# Patient Record
Sex: MALE | Race: WHITE | NOT HISPANIC OR LATINO | Employment: OTHER | URBAN - METROPOLITAN AREA
[De-identification: names, ages, dates, MRNs, and addresses within clinical notes are randomized per-mention and may not be internally consistent; named-entity substitution may affect disease eponyms.]

---

## 2017-12-11 ENCOUNTER — LAB VISIT (OUTPATIENT)
Dept: LAB | Facility: HOSPITAL | Age: 67
End: 2017-12-11
Attending: INTERNAL MEDICINE
Payer: MEDICARE

## 2017-12-11 DIAGNOSIS — E78.2 MIXED HYPERLIPIDEMIA: ICD-10-CM

## 2017-12-11 LAB
CHOLEST SERPL-MCNC: 119 MG/DL
CHOLEST/HDLC SERPL: 3.1 {RATIO}
HDLC SERPL-MCNC: 39 MG/DL
HDLC SERPL: 32.8 %
LDLC SERPL CALC-MCNC: 51.2 MG/DL
NONHDLC SERPL-MCNC: 80 MG/DL
TRIGL SERPL-MCNC: 144 MG/DL

## 2017-12-11 PROCEDURE — 36415 COLL VENOUS BLD VENIPUNCTURE: CPT

## 2017-12-11 PROCEDURE — 80061 LIPID PANEL: CPT

## 2017-12-20 ENCOUNTER — LAB VISIT (OUTPATIENT)
Dept: LAB | Facility: HOSPITAL | Age: 67
End: 2017-12-20
Attending: INTERNAL MEDICINE
Payer: MEDICARE

## 2017-12-20 DIAGNOSIS — E03.9 ACQUIRED HYPOTHYROIDISM: ICD-10-CM

## 2017-12-20 LAB
T3 SERPL-MCNC: 95 NG/DL
T4 FREE SERPL-MCNC: 1.04 NG/DL
TSH SERPL DL<=0.005 MIU/L-ACNC: 1.26 UIU/ML

## 2017-12-20 PROCEDURE — 84443 ASSAY THYROID STIM HORMONE: CPT

## 2017-12-20 PROCEDURE — 84439 ASSAY OF FREE THYROXINE: CPT

## 2017-12-20 PROCEDURE — 84480 ASSAY TRIIODOTHYRONINE (T3): CPT

## 2017-12-20 PROCEDURE — 36415 COLL VENOUS BLD VENIPUNCTURE: CPT | Mod: PO

## 2020-09-25 ENCOUNTER — TELEPHONE (OUTPATIENT)
Dept: RADIOLOGY | Facility: HOSPITAL | Age: 70
End: 2020-09-25

## 2020-09-25 DIAGNOSIS — Z01.812 PRE-PROCEDURE LAB EXAM: ICD-10-CM

## 2020-09-25 DIAGNOSIS — M48.061 SPINAL STENOSIS OF LUMBAR REGION WITHOUT NEUROGENIC CLAUDICATION: Primary | ICD-10-CM

## 2020-09-25 RX ORDER — ATORVASTATIN CALCIUM 10 MG/1
10 TABLET, FILM COATED ORAL DAILY
COMMUNITY
End: 2022-08-17 | Stop reason: SDUPTHER

## 2020-09-25 RX ORDER — LANOLIN ALCOHOL/MO/W.PET/CERES
2000 CREAM (GRAM) TOPICAL DAILY
COMMUNITY

## 2020-10-04 ENCOUNTER — LAB VISIT (OUTPATIENT)
Dept: SPORTS MEDICINE | Facility: CLINIC | Age: 70
End: 2020-10-04
Payer: MEDICARE

## 2020-10-04 DIAGNOSIS — Z01.812 PRE-PROCEDURE LAB EXAM: ICD-10-CM

## 2020-10-04 LAB — SARS-COV-2 RNA RESP QL NAA+PROBE: NOT DETECTED

## 2020-10-04 PROCEDURE — U0003 INFECTIOUS AGENT DETECTION BY NUCLEIC ACID (DNA OR RNA); SEVERE ACUTE RESPIRATORY SYNDROME CORONAVIRUS 2 (SARS-COV-2) (CORONAVIRUS DISEASE [COVID-19]), AMPLIFIED PROBE TECHNIQUE, MAKING USE OF HIGH THROUGHPUT TECHNOLOGIES AS DESCRIBED BY CMS-2020-01-R: HCPCS

## 2020-10-07 ENCOUNTER — HOSPITAL ENCOUNTER (OUTPATIENT)
Dept: RADIOLOGY | Facility: HOSPITAL | Age: 70
Discharge: HOME | End: 2020-10-07
Attending: ORTHOPAEDIC SURGERY
Payer: MEDICARE

## 2020-10-07 ENCOUNTER — HOSPITAL ENCOUNTER (OUTPATIENT)
Facility: HOSPITAL | Age: 70
Discharge: HOME | End: 2020-10-07
Attending: ORTHOPAEDIC SURGERY | Admitting: ORTHOPAEDIC SURGERY
Payer: MEDICARE

## 2020-10-07 VITALS
HEIGHT: 69 IN | BODY MASS INDEX: 32.88 KG/M2 | TEMPERATURE: 98 F | WEIGHT: 222 LBS | OXYGEN SATURATION: 97 % | SYSTOLIC BLOOD PRESSURE: 122 MMHG | HEART RATE: 74 BPM | RESPIRATION RATE: 16 BRPM | DIASTOLIC BLOOD PRESSURE: 77 MMHG

## 2020-10-07 DIAGNOSIS — M48.061 SPINAL STENOSIS OF LUMBAR REGION WITHOUT NEUROGENIC CLAUDICATION: ICD-10-CM

## 2020-10-07 LAB — GLUCOSE SERPL-MCNC: 96 MG/DL (ref 70–110)

## 2020-10-07 PROCEDURE — 72132 CT LUMBAR SPINE W/DYE: CPT | Mod: TC

## 2020-10-07 PROCEDURE — 71000015 HC POSTOP RECOV 1ST HR

## 2020-10-07 PROCEDURE — 25500020 PHARM REV CODE 255: Performed by: ORTHOPAEDIC SURGERY

## 2020-10-07 PROCEDURE — 71000016 HC POSTOP RECOV ADDL HR

## 2020-10-07 RX ADMIN — IOHEXOL 5 ML: 300 INJECTION, SOLUTION INTRAVENOUS at 10:10

## 2021-02-18 ENCOUNTER — IMMUNIZATION (OUTPATIENT)
Dept: INTERNAL MEDICINE | Facility: CLINIC | Age: 71
End: 2021-02-18
Payer: MEDICARE

## 2021-02-18 DIAGNOSIS — Z23 NEED FOR VACCINATION: Primary | ICD-10-CM

## 2021-02-18 PROCEDURE — 91300 COVID-19, MRNA, LNP-S, PF, 30 MCG/0.3 ML DOSE VACCINE: CPT | Mod: PBBFAC | Performed by: INTERNAL MEDICINE

## 2021-03-11 ENCOUNTER — IMMUNIZATION (OUTPATIENT)
Dept: INTERNAL MEDICINE | Facility: CLINIC | Age: 71
End: 2021-03-11
Payer: MEDICARE

## 2021-03-11 DIAGNOSIS — Z23 NEED FOR VACCINATION: Primary | ICD-10-CM

## 2021-03-11 PROCEDURE — 91300 COVID-19, MRNA, LNP-S, PF, 30 MCG/0.3 ML DOSE VACCINE: CPT | Mod: S$GLB,,, | Performed by: INTERNAL MEDICINE

## 2021-03-11 PROCEDURE — 0002A COVID-19, MRNA, LNP-S, PF, 30 MCG/0.3 ML DOSE VACCINE: CPT | Mod: CV19,S$GLB,, | Performed by: INTERNAL MEDICINE

## 2021-03-11 PROCEDURE — 0002A COVID-19, MRNA, LNP-S, PF, 30 MCG/0.3 ML DOSE VACCINE: ICD-10-PCS | Mod: CV19,S$GLB,, | Performed by: INTERNAL MEDICINE

## 2021-03-11 PROCEDURE — 91300 COVID-19, MRNA, LNP-S, PF, 30 MCG/0.3 ML DOSE VACCINE: ICD-10-PCS | Mod: S$GLB,,, | Performed by: INTERNAL MEDICINE

## 2021-08-11 PROBLEM — G60.9 PERIPHERAL NEUROPATHY, IDIOPATHIC: Status: ACTIVE | Noted: 2021-08-11

## 2021-08-18 PROBLEM — E78.1 ABNORMALLY LOW HIGH DENSITY LIPOPROTEIN (HDL) CHOLESTEROL WITH HYPERTRIGLYCERIDEMIA: Status: ACTIVE | Noted: 2021-08-18

## 2021-08-18 PROBLEM — E78.6 ABNORMALLY LOW HIGH DENSITY LIPOPROTEIN (HDL) CHOLESTEROL WITH HYPERTRIGLYCERIDEMIA: Status: ACTIVE | Noted: 2021-08-18

## 2021-11-10 PROBLEM — R33.8 BENIGN PROSTATIC HYPERPLASIA WITH URINARY RETENTION: Status: ACTIVE | Noted: 2021-11-10

## 2021-11-10 PROBLEM — N40.1 BENIGN PROSTATIC HYPERPLASIA WITH URINARY RETENTION: Status: ACTIVE | Noted: 2021-11-10

## 2022-02-24 PROBLEM — Z86.79 HISTORY OF SUBDURAL HEMATOMA: Status: ACTIVE | Noted: 2022-02-24

## 2022-08-17 PROBLEM — Z00.00 ANNUAL PHYSICAL EXAM: Status: ACTIVE | Noted: 2022-08-17

## 2022-08-17 PROBLEM — Z86.39 HISTORY OF HASHIMOTO THYROIDITIS: Status: ACTIVE | Noted: 2022-08-17

## 2022-11-16 PROBLEM — M15.0 PRIMARY OSTEOARTHRITIS INVOLVING MULTIPLE JOINTS: Status: ACTIVE | Noted: 2022-11-16

## 2022-11-16 PROBLEM — M15.9 PRIMARY OSTEOARTHRITIS INVOLVING MULTIPLE JOINTS: Status: ACTIVE | Noted: 2022-11-16

## 2022-11-16 PROBLEM — E66.812 CLASS 2 OBESITY DUE TO EXCESS CALORIES WITHOUT SERIOUS COMORBIDITY WITH BODY MASS INDEX (BMI) OF 37.0 TO 37.9 IN ADULT: Status: ACTIVE | Noted: 2022-11-16

## 2022-11-16 PROBLEM — E66.09 CLASS 2 OBESITY DUE TO EXCESS CALORIES WITHOUT SERIOUS COMORBIDITY WITH BODY MASS INDEX (BMI) OF 37.0 TO 37.9 IN ADULT: Status: ACTIVE | Noted: 2022-11-16

## 2022-11-21 PROBLEM — Z00.00 ANNUAL PHYSICAL EXAM: Status: RESOLVED | Noted: 2022-08-17 | Resolved: 2022-11-21

## 2023-02-22 PROBLEM — R60.0 PEDAL EDEMA: Status: ACTIVE | Noted: 2023-02-22

## 2023-02-22 PROBLEM — Z79.899 ENCOUNTER FOR LONG-TERM (CURRENT) USE OF MEDICATIONS: Status: ACTIVE | Noted: 2023-02-22

## 2023-02-22 PROBLEM — D50.9 IRON DEFICIENCY ANEMIA: Status: ACTIVE | Noted: 2023-02-22

## 2023-02-22 PROBLEM — E66.09 CLASS 1 OBESITY DUE TO EXCESS CALORIES WITH SERIOUS COMORBIDITY AND BODY MASS INDEX (BMI) OF 34.0 TO 34.9 IN ADULT: Status: ACTIVE | Noted: 2023-02-22

## 2023-02-22 PROBLEM — G25.81 RESTLESS LEG SYNDROME: Status: ACTIVE | Noted: 2023-02-22

## 2023-02-22 PROBLEM — I10 MODERATE HYPERTENSION: Status: ACTIVE | Noted: 2023-02-22

## 2023-02-22 PROBLEM — E66.811 CLASS 1 OBESITY DUE TO EXCESS CALORIES WITH SERIOUS COMORBIDITY AND BODY MASS INDEX (BMI) OF 34.0 TO 34.9 IN ADULT: Status: ACTIVE | Noted: 2023-02-22

## 2023-05-29 PROBLEM — Z00.00 ANNUAL PHYSICAL EXAM: Status: RESOLVED | Noted: 2022-08-17 | Resolved: 2023-05-29

## 2024-02-28 PROBLEM — Z12.5 SCREENING PSA (PROSTATE SPECIFIC ANTIGEN): Status: ACTIVE | Noted: 2024-02-28

## 2024-02-28 PROBLEM — Z00.00 ANNUAL PHYSICAL EXAM: Status: ACTIVE | Noted: 2024-02-28

## 2024-02-28 PROBLEM — I83.93 ASYMPTOMATIC VARICOSE VEINS OF BOTH LOWER EXTREMITIES: Status: ACTIVE | Noted: 2024-02-28

## 2024-05-05 ENCOUNTER — HOSPITAL ENCOUNTER (EMERGENCY)
Facility: HOSPITAL | Age: 74
Discharge: HOME | End: 2024-05-05
Attending: STUDENT IN AN ORGANIZED HEALTH CARE EDUCATION/TRAINING PROGRAM
Payer: MEDICARE

## 2024-05-05 VITALS
WEIGHT: 235 LBS | HEART RATE: 71 BPM | RESPIRATION RATE: 20 BRPM | OXYGEN SATURATION: 97 % | SYSTOLIC BLOOD PRESSURE: 143 MMHG | HEIGHT: 69 IN | BODY MASS INDEX: 34.8 KG/M2 | TEMPERATURE: 98 F | DIASTOLIC BLOOD PRESSURE: 97 MMHG

## 2024-05-05 DIAGNOSIS — R00.2 PALPITATION: ICD-10-CM

## 2024-05-05 DIAGNOSIS — R06.02 SHORTNESS OF BREATH: ICD-10-CM

## 2024-05-05 DIAGNOSIS — R00.2 PALPITATIONS: ICD-10-CM

## 2024-05-05 DIAGNOSIS — R42 DIZZINESS: Primary | ICD-10-CM

## 2024-05-05 LAB
ALBUMIN SERPL BCP-MCNC: 3.6 G/DL (ref 3.5–5.2)
ALP SERPL-CCNC: 32 U/L (ref 55–135)
ALT SERPL W/O P-5'-P-CCNC: 31 U/L (ref 10–44)
ANION GAP SERPL CALC-SCNC: 10 MMOL/L (ref 8–16)
AST SERPL-CCNC: 37 U/L (ref 10–40)
BASOPHILS # BLD AUTO: 0.06 K/UL (ref 0–0.2)
BASOPHILS NFR BLD: 1.1 % (ref 0–1.9)
BILIRUB SERPL-MCNC: 0.4 MG/DL (ref 0.1–1)
BNP SERPL-MCNC: 12 PG/ML (ref 0–99)
BUN SERPL-MCNC: 11 MG/DL (ref 8–23)
CALCIUM SERPL-MCNC: 9 MG/DL (ref 8.7–10.5)
CHLORIDE SERPL-SCNC: 109 MMOL/L (ref 95–110)
CO2 SERPL-SCNC: 21 MMOL/L (ref 23–29)
CREAT SERPL-MCNC: 0.8 MG/DL (ref 0.5–1.4)
DIFFERENTIAL METHOD BLD: ABNORMAL
EOSINOPHIL # BLD AUTO: 0.1 K/UL (ref 0–0.5)
EOSINOPHIL NFR BLD: 2 % (ref 0–8)
ERYTHROCYTE [DISTWIDTH] IN BLOOD BY AUTOMATED COUNT: 13 % (ref 11.5–14.5)
EST. GFR  (NO RACE VARIABLE): >60 ML/MIN/1.73 M^2
GLUCOSE SERPL-MCNC: 116 MG/DL (ref 70–110)
HCT VFR BLD AUTO: 38.3 % (ref 40–54)
HGB BLD-MCNC: 12.7 G/DL (ref 14–18)
IMM GRANULOCYTES # BLD AUTO: 0.07 K/UL (ref 0–0.04)
IMM GRANULOCYTES NFR BLD AUTO: 1.3 % (ref 0–0.5)
LYMPHOCYTES # BLD AUTO: 1.4 K/UL (ref 1–4.8)
LYMPHOCYTES NFR BLD: 25.3 % (ref 18–48)
MCH RBC QN AUTO: 30.6 PG (ref 27–31)
MCHC RBC AUTO-ENTMCNC: 33.2 G/DL (ref 32–36)
MCV RBC AUTO: 92 FL (ref 82–98)
MONOCYTES # BLD AUTO: 0.7 K/UL (ref 0.3–1)
MONOCYTES NFR BLD: 12.2 % (ref 4–15)
NEUTROPHILS # BLD AUTO: 3.2 K/UL (ref 1.8–7.7)
NEUTROPHILS NFR BLD: 58.1 % (ref 38–73)
NRBC BLD-RTO: 0 /100 WBC
PLATELET # BLD AUTO: 202 K/UL (ref 150–450)
PMV BLD AUTO: 9.3 FL (ref 9.2–12.9)
POTASSIUM SERPL-SCNC: 4 MMOL/L (ref 3.5–5.1)
PROT SERPL-MCNC: 6.6 G/DL (ref 6–8.4)
RBC # BLD AUTO: 4.15 M/UL (ref 4.6–6.2)
SODIUM SERPL-SCNC: 140 MMOL/L (ref 136–145)
T4 FREE SERPL-MCNC: 0.92 NG/DL (ref 0.71–1.51)
TROPONIN I SERPL DL<=0.01 NG/ML-MCNC: <0.006 NG/ML (ref 0–0.03)
TSH SERPL DL<=0.005 MIU/L-ACNC: 7.37 UIU/ML (ref 0.4–4)
WBC # BLD AUTO: 5.49 K/UL (ref 3.9–12.7)

## 2024-05-05 PROCEDURE — 96360 HYDRATION IV INFUSION INIT: CPT

## 2024-05-05 PROCEDURE — 93010 ELECTROCARDIOGRAM REPORT: CPT | Mod: ,,, | Performed by: INTERNAL MEDICINE

## 2024-05-05 PROCEDURE — 25000003 PHARM REV CODE 250: Performed by: STUDENT IN AN ORGANIZED HEALTH CARE EDUCATION/TRAINING PROGRAM

## 2024-05-05 PROCEDURE — 99285 EMERGENCY DEPT VISIT HI MDM: CPT | Mod: 25

## 2024-05-05 PROCEDURE — 85025 COMPLETE CBC W/AUTO DIFF WBC: CPT

## 2024-05-05 PROCEDURE — 84439 ASSAY OF FREE THYROXINE: CPT

## 2024-05-05 PROCEDURE — 84484 ASSAY OF TROPONIN QUANT: CPT

## 2024-05-05 PROCEDURE — 93005 ELECTROCARDIOGRAM TRACING: CPT

## 2024-05-05 PROCEDURE — 84443 ASSAY THYROID STIM HORMONE: CPT

## 2024-05-05 PROCEDURE — 80053 COMPREHEN METABOLIC PANEL: CPT

## 2024-05-05 PROCEDURE — 83880 ASSAY OF NATRIURETIC PEPTIDE: CPT

## 2024-05-05 RX ORDER — MECLIZINE HYDROCHLORIDE 25 MG/1
50 TABLET ORAL
Status: COMPLETED | OUTPATIENT
Start: 2024-05-05 | End: 2024-05-05

## 2024-05-05 RX ORDER — MECLIZINE HYDROCHLORIDE 25 MG/1
25 TABLET ORAL 3 TIMES DAILY PRN
Qty: 20 TABLET | Refills: 0 | Status: SHIPPED | OUTPATIENT
Start: 2024-05-05

## 2024-05-05 RX ADMIN — MECLIZINE HYDROCHLORIDE 50 MG: 25 TABLET ORAL at 12:05

## 2024-05-05 RX ADMIN — SODIUM CHLORIDE 1000 ML: 9 INJECTION, SOLUTION INTRAVENOUS at 12:05

## 2024-05-05 NOTE — DISCHARGE INSTRUCTIONS

## 2024-05-05 NOTE — ED PROVIDER NOTES
Encounter Date: 5/5/2024       History     Chief Complaint   Patient presents with    Palpitations     Woke him from sleep this morning around 0500 with associated dizziness and feeling off balance. Denies CP, SOB, diaphoresis. States BP was elevated at 158/101 PTA. Hx a-fib     74-year-old male presents with an episode of dizziness that began this morning.  He says that he felt as if his atrial fibrillation was acting up which resulted in his lightheadedness and dizziness.  This has now improved in his no longer as severe.  No chest pain shortness for breath.  He says he does not take anticoagulation for AFib as he is not always in this rhythm.  No fevers.  No recent instrumentation.  No numbness, dysphagia, dysarthria, numbness unilateral weakness, ataxia, falls or trauma.  He does have a history of feeling this sensation dizziness before and feels similar.  He says he does not have the medication that he is to take for dizziness.      Review of patient's allergies indicates:   Allergen Reactions    Niacin      Other reaction(s): FLUSHING AND ITCHING, FLUSHING AND ITCHING with 1500mg dose    Zanaflex [tizanidine]      dizziness     Past Medical History:   Diagnosis Date    History of Hashimoto thyroiditis     History of nephrolithiasis     Iron deficiency anemia 02/22/2023    Moderate hypertension 02/22/2023    Paroxysmal atrial fibrillation     Peripheral neuropathy, idiopathic 08/11/2021    Sleep apnea      Past Surgical History:   Procedure Laterality Date    ablasion Left     venous- leg    BACK SURGERY  11/2020    COLONOSCOPY  02/12/2019    Done by Dr. DAVID Romero    DENTAL SURGERY      EPIDURAL STEROID INJECTION  03/2018, 09/2018    pat reported    head surgery      for subdural hematoma    MYELOGRAPHY N/A 10/07/2020    Procedure: MYELOGRAM;  Surgeon: Cinda Diagnostic Provider;  Location: [facility];  Service: Interventional Radiology;  Laterality: N/A;    PROSTATE SURGERY      RHIZOTOMY  03/2019    pat reported     sinus bone spur      TONSILLECTOMY      UMBILICAL HERNIA REPAIR       Family History   Problem Relation Name Age of Onset    Diabetes Mother      Cancer Sister       Social History     Tobacco Use    Smoking status: Never    Smokeless tobacco: Never   Substance Use Topics    Alcohol use: No    Drug use: No     Review of Systems    Physical Exam     Initial Vitals [05/05/24 1153]   BP Pulse Resp Temp SpO2   133/73 88 18 98.3 °F (36.8 °C) 97 %      MAP       --         Physical Exam    Nursing note and vitals reviewed.  Constitutional: He appears well-developed and well-nourished. He is not diaphoretic. No distress.   HENT:   Head: Normocephalic and atraumatic.   Eyes: EOM are normal. Pupils are equal, round, and reactive to light.   With extraocular muscle testing, patient does become dizzy   Neck: Neck supple. No JVD present.   Normal range of motion.  Cardiovascular:  Normal rate, regular rhythm, normal heart sounds and intact distal pulses.     Exam reveals no gallop and no friction rub.       No murmur heard.  Pulmonary/Chest: Breath sounds normal. No stridor. No respiratory distress. He has no wheezes.   Abdominal: Abdomen is soft. There is no abdominal tenderness.   Musculoskeletal:         General: No tenderness or edema. Normal range of motion.      Cervical back: Normal range of motion and neck supple.     Neurological: He is alert and oriented to person, place, and time. GCS score is 15. GCS eye subscore is 4. GCS verbal subscore is 5. GCS motor subscore is 6.   Skin: Skin is warm and dry. Capillary refill takes less than 2 seconds.   Psychiatric: He has a normal mood and affect. Thought content normal.         ED Course   Procedures  Labs Reviewed   CBC W/ AUTO DIFFERENTIAL - Abnormal; Notable for the following components:       Result Value    RBC 4.15 (*)     Hemoglobin 12.7 (*)     Hematocrit 38.3 (*)     Immature Granulocytes 1.3 (*)     Immature Grans (Abs) 0.07 (*)     All other components within  normal limits   COMPREHENSIVE METABOLIC PANEL - Abnormal; Notable for the following components:    CO2 21 (*)     Glucose 116 (*)     Alkaline Phosphatase 32 (*)     All other components within normal limits   TROPONIN I   B-TYPE NATRIURETIC PEPTIDE   TSH          Imaging Results              X-Ray Chest AP Portable (Final result)  Result time 05/05/24 13:08:30      Final result by Inderjit Neville MD (05/05/24 13:08:30)                   Impression:      No radiographic acute intrathoracic process seen on this single view.      Electronically signed by: Inderjit Neville MD  Date:    05/05/2024  Time:    13:08               Narrative:    EXAMINATION:  XR CHEST AP PORTABLE    CLINICAL HISTORY:  Palpitations    TECHNIQUE:  Single frontal view of the chest was performed.    COMPARISON:  Chest radiograph 10/15/2013, CT calcium scoring 03/21/2024    FINDINGS:  Monitoring leads overlie the chest.  Resolution is limited by body habitus with underpenetration.    Cardiomediastinal silhouette is midline with calcification and tortuosity of the aorta.  Heart is not significantly enlarged.  Pulmonary vasculature and hilar contours are within normal limits.    Bibasilar minimal platelike scarring versus atelectasis.  The lungs are otherwise well expanded without consolidation, pleural effusion or pneumothorax.  Thoracic spinal electrodes noted.  No acute osseous process seen.  PA and lateral views can be obtained.                                       Medications   meclizine tablet 50 mg (50 mg Oral Given 5/5/24 1220)   sodium chloride 0.9% bolus 1,000 mL 1,000 mL (1,000 mLs Intravenous New Bag 5/5/24 1232)     Medical Decision Making  Hemodynamically stable. Afebrile. Phonating and protecting the airway spontaneously. No clinical evidence for cardiovascular instability or impending airway compromise. Examination as above. Additional historians include family at bedside. Prior medical records reviewed.  Per primary care notes, the  patient has a history of labyrinthitis. Current co-morbidities considered that will impact clinical decision making include as above.    Plan:  Labs, fluids, supportive care.  If dizziness does not improve, will obtain dedicated cerebral imaging.  Clinically, his postural dizziness does appear to be more suggestive for a peripheral process.  He has no in atrial fibrillation at this time.                 ED Course as of 05/05/24 1333   Sun May 05, 2024   1327 Chest x-ray reviewed.  CBC negative.  CMP negative.  Cardiac markers are negative. [BG]   1332 EKG reviewed, patient was not atrial fibrillation.  No regional ST segment elevation. The patient was reassessed and on subsequent re-evaluation, they were subjectively feeling better. They were resting comfortably and in no acute distress. I discussed the laboratory and diagnostic findings with the patient. Education was provided and all questions were answered. As discussed, they were recommended to follow up with their primary care physician within the next few days and to return to the emergency department sooner for any new or worsening. They acknowledged and verbalized agreement to the treatment plan. The patient was discharged home in stable condition.     DISCLAIMER: This note was prepared with [employer] voice recognition transcription software. Garbled syntax, mangled pronouns, and other bizarre constructions may be attributed to that software system.     [BG]      ED Course User Index  [BG] Steve Miller MD                           Clinical Impression:  Final diagnoses:  [R00.2] Palpitation  [R00.2] Palpitations  [R06.02] Shortness of breath  [R42] Dizziness (Primary)          ED Disposition Condition    Discharge Stable          ED Prescriptions       Medication Sig Dispense Start Date End Date Auth. Provider    meclizine (ANTIVERT) 25 mg tablet Take 1 tablet (25 mg total) by mouth 3 (three) times daily as needed for Dizziness. 20 tablet 5/5/2024 --  Steve Miller MD          Follow-up Information       Follow up With Specialties Details Why Contact Info    Nehemiah Grier, DO Family Medicine Go to  As needed [address]  [address]  [place] [address]  [phone]               Steve Miller MD  05/05/24 4721

## 2024-05-05 NOTE — ED TRIAGE NOTES
Pt presents to ED c/o palpitations and dizziness that woke him up around 0500. Report Hx of A-fib.

## 2024-05-07 LAB
OHS QRS DURATION: 94 MS
OHS QTC CALCULATION: 454 MS

## 2024-06-17 ENCOUNTER — HOSPITAL ENCOUNTER (EMERGENCY)
Facility: HOSPITAL | Age: 74
Discharge: HOME | End: 2024-06-17
Attending: EMERGENCY MEDICINE
Payer: MEDICARE

## 2024-06-17 VITALS
TEMPERATURE: 99 F | HEART RATE: 74 BPM | HEIGHT: 70 IN | SYSTOLIC BLOOD PRESSURE: 135 MMHG | RESPIRATION RATE: 19 BRPM | BODY MASS INDEX: 32.93 KG/M2 | OXYGEN SATURATION: 97 % | WEIGHT: 230 LBS | DIASTOLIC BLOOD PRESSURE: 80 MMHG

## 2024-06-17 DIAGNOSIS — T14.8XXA BLOOD BLISTER: ICD-10-CM

## 2024-06-17 DIAGNOSIS — M79.644 FINGER PAIN, RIGHT: ICD-10-CM

## 2024-06-17 DIAGNOSIS — S61.213A LACERATION OF LEFT MIDDLE FINGER WITHOUT FOREIGN BODY WITHOUT DAMAGE TO NAIL, INITIAL ENCOUNTER: Primary | ICD-10-CM

## 2024-06-17 PROCEDURE — 25000003 PHARM REV CODE 250

## 2024-06-17 PROCEDURE — 99284 EMERGENCY DEPT VISIT MOD MDM: CPT | Mod: 25

## 2024-06-17 RX ORDER — LIDOCAINE HYDROCHLORIDE 10 MG/ML
10 INJECTION INFILTRATION; PERINEURAL
Status: DISCONTINUED | OUTPATIENT
Start: 2024-06-17 | End: 2024-06-17

## 2024-06-17 RX ORDER — MUPIROCIN 20 MG/G
OINTMENT TOPICAL
Status: COMPLETED | OUTPATIENT
Start: 2024-06-17 | End: 2024-06-17

## 2024-06-17 RX ADMIN — MUPIROCIN: 20 OINTMENT TOPICAL at 02:06

## 2024-06-17 NOTE — ED PROVIDER NOTES
Encounter Date: 6/17/2024    SCRIBE #1 NOTE: I, Sarai Sen, am scribing for, and in the presence of,  Gil Sotomayor PA-C. I have scribed the following portions of the note - Other sections scribed: HPI, ROS, PE.       History     Chief Complaint   Patient presents with    Laceration     Pt states he was slicing cheese PTA and cut third digit to left hand. Pt also states a brick fell onto right index finger 1 week ago and has noticed a blood blister to digit. Full range of motion intact.      Patient is a 74 y.o. male with a past medical history of iron deficiency anemia, hypertension, and paroxysmal atrial fibrillation who presents to the Emergency Department for evaluation of laceration to left 3rd digit s/p accidentally slicing finger with knife 1 hour PTA.  He reports trying to/cheese with a knife.  Patient also reports a brick fell onto his right index finger 1 week ago and has noticed a blood blister on the tip of his finger.  He has been icing the area and soaking his finger in Epsom salt to soothe wound. Patient has not taken any medications for the symptoms. Patient is not on blood thinners. Denies history of diabetes mellitus or chronic kidney disease. Last tetanus unknown.  He denies fever, chills, nausea, or vomiting.  No numbness or tingling.  No other complaints today.    The history is provided by the patient. No  was used.     Review of patient's allergies indicates:   Allergen Reactions    Niacin      Other reaction(s): FLUSHING AND ITCHING, FLUSHING AND ITCHING with 1500mg dose    Zanaflex [tizanidine]      dizziness     Past Medical History:   Diagnosis Date    History of Hashimoto thyroiditis     History of nephrolithiasis     Iron deficiency anemia 02/22/2023    Moderate hypertension 02/22/2023    Paroxysmal atrial fibrillation     Peripheral neuropathy, idiopathic 08/11/2021    Sleep apnea      Past Surgical History:   Procedure Laterality Date    ablasion Left      venous- leg    BACK SURGERY  11/2020    COLONOSCOPY  02/12/2019    Done by Dr. DAVID Romero    DENTAL SURGERY      EPIDURAL STEROID INJECTION  03/2018, 09/2018    pat reported    head surgery      for subdural hematoma    MYELOGRAPHY N/A 10/07/2020    Procedure: MYELOGRAM;  Surgeon: Cinda Diagnostic Provider;  Location: [facility] OR;  Service: Interventional Radiology;  Laterality: N/A;    PROSTATE SURGERY      RHIZOTOMY  03/2019    pat reported    sinus bone spur      TONSILLECTOMY      UMBILICAL HERNIA REPAIR       Family History   Problem Relation Name Age of Onset    Diabetes Mother      Cancer Sister       Social History     Tobacco Use    Smoking status: Never    Smokeless tobacco: Never   Substance Use Topics    Alcohol use: No    Drug use: No     Review of Systems   Constitutional:  Negative for chills and fatigue.   Respiratory:  Negative for shortness of breath.    Cardiovascular:  Negative for chest pain.   Gastrointestinal:  Negative for abdominal pain, diarrhea and nausea.   Musculoskeletal:  Positive for arthralgias. Negative for joint swelling, neck pain and neck stiffness.   Skin:  Positive for wound (laceration to 3rd L digit, blister R index finger).   Neurological:  Negative for dizziness, light-headedness, numbness and headaches.       Physical Exam     Initial Vitals [06/17/24 1415]   BP Pulse Resp Temp SpO2   135/80 74 19 98.9 °F (37.2 °C) 97 %      MAP       --         Physical Exam    Nursing note and vitals reviewed.  Constitutional: He appears well-developed and well-nourished.   HENT:   Head: Normocephalic and atraumatic.   Right Ear: External ear normal.   Left Ear: External ear normal.   Neck: Carotid bruit is not present.   Normal range of motion.  Cardiovascular:  Normal rate, regular rhythm, normal heart sounds and intact distal pulses.     Exam reveals no gallop and no friction rub.       No murmur heard.  Pulmonary/Chest: Breath sounds normal. No respiratory distress. He has no wheezes. He  has no rhonchi. He has no rales.   Abdominal: Abdomen is soft. Bowel sounds are normal. He exhibits no distension. There is no abdominal tenderness. There is no rebound and no guarding.   Musculoskeletal:         General: Normal range of motion.      Cervical back: Normal range of motion.     Neurological: He is alert and oriented to person, place, and time. GCS score is 15. GCS eye subscore is 4. GCS verbal subscore is 5. GCS motor subscore is 6.   Skin:   The left 3rd digit is with missing piece of skin, distally.  Very minimal active bleeding.  Nothing required repair. Normal capillary refill and range of motion. Neurovascularly intact. Right index finger with blood blister, distally. Minimal swelling noted. Normal range of motion. No erythema over flexor tendon sheath. Neurovascularly intact.    Psychiatric: He has a normal mood and affect.         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      Electronically signed by: Barbra Marie MD  Date:    06/17/2024  Time:    14:48               Narrative:    EXAMINATION:  XR HAND COMPLETE 3 VIEW LEFT    CLINICAL HISTORY:  finger laceration;.    TECHNIQUE:  PA, lateral, and oblique views of the left hand were performed.    COMPARISON:  None    FINDINGS:  The alignment of the osseous structures is normal.  The mineralization is normal.    Remote ulnar styloid fracture.    Severe scaphotrapezium joint space narrowing and sclerosis, joint space narrowing, and osteophyte at the 1st carpometacarpal joint consistent with degenerative change.  The metacarpophalangeal joints and proximal interphalangeal joints appear within normal limits.    There is significant joint space  narrowing, osteophyte formation and mild erosive changes mainly at the distal interphalangeal joint of the 3rd and 5th digit.  No soft tissue air, no foreign body.                                       X-Ray Hand 3 View Right (Final result)  Result time 06/17/24 14:50:19      Final result by Barbra Marie MD (06/17/24 14:50:19)                   Impression:      Degenerative change most severe at the 1st carpometacarpal joint and distal interphalangeal joints.      Electronically signed by: Barbra Marie MD  Date:    06/17/2024  Time:    14:50               Narrative:    EXAMINATION:  XR HAND COMPLETE 3 VIEW RIGHT    CLINICAL HISTORY:  right hand pain;    TECHNIQUE:  PA, lateral, and oblique views of the right hand were performed.    COMPARISON:  None    FINDINGS:  Normal mineralization, normal alignment.    The radiocarpal joint, intercarpal joint, 2nd through 5th carpometacarpal joints appear normal.    Joint space narrowing, sclerosis and osteophyte at the 1st carpometacarpal joint.    Joint space narrowing and osteophyte at the 3rd metacarpophalangeal joint.    The proximal interphalangeal joints are within normal limits.  There is diffuse distal interphalangeal joint space narrowing.    No fracture, no osseous lesions.    The soft tissues appear normal.                                       Medications   Tdap (BOOSTRIX) vaccine injection 0.5 mL (has no administration in time range)   mupirocin 2 % ointment ( Topical (Top) Given 6/17/24 1441)     Medical Decision Making  This is an emergent evaluation of a  74 y.o. male with a past medical history of iron deficiency anemia, hypertension, and paroxysmal atrial fibrillation who presents to the Emergency Department for evaluation of laceration to left 3rd digit s/p accidentally slicing finger with knife 1 hour PTA.  Also reports right index finger pain after brick falling onto it 1 week ago.    Patient looks well clinically. The left 3rd digit is  with missing piece of skin, distally.  Very minimal active bleeding.  Nothing required repair. Normal capillary refill and range of motion. Neurovascularly intact. Right index finger with blood blister, distally. Minimal swelling noted. Normal range of motion. No erythema over flexor tendon sheath. Neurovascularly intact.  Regular rate rhythm without murmurs.  No carotid bruits appreciated on exam. Lungs are clear to auscultation bilaterally.  Abdomen is soft, nontender, non distended, with normal bowel sounds.     Differential diagnosis includes but is not limited to fracture, dislocation, sprain, laceration, cellulitis, other infection.  Considered but doubt flexor tenosynovitis.    Workup initiated with x-rays of bilateral hands.  Ordered wound cleaning by nursing staff.  Ordered mupirocin ointment and tetanus shot.  Vital signs, chart, labs, and/or imaging were all reviewed.  See ED course below and interpretations above. My overall impression is wound not requiring repair. Will discharge home with mupirocin ointment to prevent infection.  Wound care instructions provided. Patient is very well appearing, and in no acute distress. Vital signs are reassuring here in the emergency department, patient is afebrile, breathing comfortable, satting 97 % on room air. Patient/Caregiver is stable for discharge at this time.  Patient/Caregiver was informed of results and plan of care. Patient/Caregiver verbalized understanding of care plan. All questions and concerns were addressed. Discussed strict return precautions with the patient/caregiver. Instructed follow up with primary care provider within 1 week.      Gil Sotomayor PA-C    DISCLAIMER: This note was prepared with [employer] voice recognition transcription software. Garbled syntax, mangled pronouns, and other bizarre constructions may be attributed to that software system.       Amount and/or Complexity of Data Reviewed  Radiology: ordered. Decision-making details  documented in ED Course.    Risk  Prescription drug management.            Scribe Attestation:   Scribe #1: I performed the above scribed service and the documentation accurately describes the services I performed. I attest to the accuracy of the note.        ED Course as of 06/17/24 1500   Mon Jun 17, 2024   1428 Patient presenting to the emergency department for evaluation of left 3rd digit pain after slicing finger with knife while cutting cheese.  He is unsure of last tetanus vaccine.  He also reports right index finger pain after dropping brick on it 1 week ago.  On exam, there is skin missing to the distal left 3rd digit.  Nothing requiring repair.  Normal capillary refill.  Normal range of motion.  Neurovascularly intact.  Will obtain x-rays, clean wounds, apply mupirocin ointment. [TM]   1456 X-Ray Hand 3 view Left  The alignment of the osseous structures is normal.  The mineralization is normal.     Remote ulnar styloid fracture.     Severe scaphotrapezium joint space narrowing and sclerosis, joint space narrowing, and osteophyte at the 1st carpometacarpal joint consistent with degenerative change.  The metacarpophalangeal joints and proximal interphalangeal joints appear within normal limits.     There is significant joint space narrowing, osteophyte formation and mild erosive changes mainly at the distal interphalangeal joint of the 3rd and 5th digit.  No soft tissue air, no foreign body.   [TM]   1456 X-Ray Hand 3 View Right  Normal mineralization, normal alignment.     The radiocarpal joint, intercarpal joint, 2nd through 5th carpometacarpal joints appear normal.     Joint space narrowing, sclerosis and osteophyte at the 1st carpometacarpal joint.     Joint space narrowing and osteophyte at the 3rd metacarpophalangeal joint.     The proximal interphalangeal joints are within normal limits.  There is diffuse distal interphalangeal joint space narrowing.     No fracture, no osseous lesions.     The soft  tissues appear normal.      [TM]      ED Course User Index  [TM] Gil Sotomayor PA-C                         I, Gil Sotomyaor PA-C, personally performed the services described in this documentation. All medical record entries made by the scribe were at my direction and in my presence. I have reviewed the chart and agree that the record reflects my personal performance and is accurate and complete.    Clinical Impression:  Final diagnoses:  [S61.213A] Laceration of left middle finger without foreign body without damage to nail, initial encounter (Primary)  [T14.8XXA] Blood blister  [M79.644] Finger pain, right          ED Disposition Condition    Discharge Stable          ED Prescriptions    None       Follow-up Information       Follow up With Specialties Details Why Contact Info    Nehemiah Grier, DO Family Medicine   [address] [place] [id number] [phone]      [facility] - Emergency Dept Emergency Medicine Go to  As needed, If symptoms worsen, or new symptoms develop [address] [facility] [place] [address] [phone]             Gil Sotomayor PA-C  06/17/24 8224

## 2024-06-17 NOTE — DISCHARGE INSTRUCTIONS
You were seen in the emergency department today for finger wounds. Your x-rays showed no broken bones but a lot of arthritis. Please use mupirocin ointment as we discussed to prevent infection. Follow up with your PCP. It is important to remember that some problems are difficult to diagnose and may not be found during your Emergency Department visit. Be sure to follow up with your primary care doctor and review all labs/imaging/tests that were performed during this visit with them. Some labs/tests may be outside of the normal range and require non-emergent follow-up and further investigation to help diagnose/exclude/prevent complications or other medical conditions. Return to the emergency department for any new or worsening symptoms. Thank you for allowing me to care for you today, it was my pleasure. I hope you get to feeling better soon!

## 2024-07-24 PROBLEM — I47.10 SUPRAVENTRICULAR TACHYCARDIA: Status: ACTIVE | Noted: 2024-07-24

## 2024-07-29 ENCOUNTER — HOSPITAL ENCOUNTER (OUTPATIENT)
Dept: RADIOLOGY | Facility: HOSPITAL | Age: 74
Discharge: HOME | End: 2024-07-29
Attending: FAMILY MEDICINE
Payer: MEDICARE

## 2024-07-29 DIAGNOSIS — Z01.810 PREOP CARDIOVASCULAR EXAM: ICD-10-CM

## 2024-07-29 PROCEDURE — 71046 X-RAY EXAM CHEST 2 VIEWS: CPT | Mod: TC,FY

## 2024-07-29 PROCEDURE — 71046 X-RAY EXAM CHEST 2 VIEWS: CPT | Mod: 26,,, | Performed by: RADIOLOGY

## 2024-09-04 ENCOUNTER — OFFICE VISIT (OUTPATIENT)
Dept: CARDIOLOGY | Facility: CLINIC | Age: 74
End: 2024-09-04
Payer: MEDICARE

## 2024-09-04 VITALS
DIASTOLIC BLOOD PRESSURE: 70 MMHG | HEIGHT: 69 IN | OXYGEN SATURATION: 94 % | RESPIRATION RATE: 18 BRPM | WEIGHT: 232.5 LBS | HEART RATE: 86 BPM | SYSTOLIC BLOOD PRESSURE: 130 MMHG | BODY MASS INDEX: 34.44 KG/M2

## 2024-09-04 DIAGNOSIS — I25.84 CORONARY ARTERY CALCIFICATION: Primary | ICD-10-CM

## 2024-09-04 DIAGNOSIS — I25.10 CORONARY ARTERY CALCIFICATION: Primary | ICD-10-CM

## 2024-09-04 DIAGNOSIS — I25.10 CORONARY ARTERY DISEASE, UNSPECIFIED VESSEL OR LESION TYPE, UNSPECIFIED WHETHER ANGINA PRESENT, UNSPECIFIED WHETHER NATIVE OR TRANSPLANTED HEART: ICD-10-CM

## 2024-09-04 PROCEDURE — 99999 PR PBB SHADOW E&M-EST. PATIENT-LVL IV: CPT | Mod: PBBFAC,,, | Performed by: INTERNAL MEDICINE

## 2024-09-04 PROCEDURE — 3078F DIAST BP <80 MM HG: CPT | Mod: CPTII,S$GLB,, | Performed by: INTERNAL MEDICINE

## 2024-09-04 PROCEDURE — 3075F SYST BP GE 130 - 139MM HG: CPT | Mod: CPTII,S$GLB,, | Performed by: INTERNAL MEDICINE

## 2024-09-04 PROCEDURE — 99204 OFFICE O/P NEW MOD 45 MIN: CPT | Mod: S$GLB,,, | Performed by: INTERNAL MEDICINE

## 2024-09-04 PROCEDURE — 1159F MED LIST DOCD IN RCRD: CPT | Mod: CPTII,S$GLB,, | Performed by: INTERNAL MEDICINE

## 2024-09-04 PROCEDURE — 3008F BODY MASS INDEX DOCD: CPT | Mod: CPTII,S$GLB,, | Performed by: INTERNAL MEDICINE

## 2024-09-04 NOTE — PROGRESS NOTES
CARDIOVASCULAR CONSULTATION    REASON FOR CONSULT:   Trell Richmond is a 74 y.o. male who presents for No chief complaint on file.       Ordered on: 5/5/2024   Associated Dx: Palpitations; Shortness of breath   Authorizing provider: Steve Miller MD         HISTORY OF PRESENT ILLNESS:     Patient is a pleasant 74-year-old man.  Has been referred for palpitations and shortness of breath as well as dyspnea on exertion.  Denies any resting chest pains.  Did have calcium scoring done which demonstrated elevated coronary calcium as listed.  Denies orthopnea PND    Results for orders placed or performed during the hospital encounter of 05/05/24   EKG 12-lead    Collection Time: 05/05/24 11:46 AM   Result Value Ref Range    QRS Duration 94 ms    [facility] QTC Calculation 454 ms    Narrative    Test Reason : R00.2,    Vent. Rate : 086 BPM     Atrial Rate : 086 BPM     P-R Int : 202 ms          QRS Dur : 094 ms      QT Int : 380 ms       P-R-T Axes : 043 -31 001 degrees     QTc Int : 454 ms    Normal sinus rhythm  Left axis deviation  Minimal voltage criteria for LVH, may be normal variant ( R in aVL )  Possible Anterior infarct ,age undetermined  Abnormal ECG  No previous ECGs available  Confirmed by Danie Milan MD ([id number]) on 5/7/2024 9:10:32 PM    Referred By: [email or web address]   SELF           Confirmed By:Danie Milan MD       SCORE SUMMARY     Your total calcium score is 153.6.     Incidental findings: Included portions of the chest demonstrate no pericardial effusion.  The thoracic aorta appears not aneurysmal.  There are calcified granulomatous lymph nodes identified within the mediastinum and right hilum.  There is calcified granuloma within the right middle lobe.  No consolidation, pleural effusion, or pneumothorax identified of the included portions of the lungs.  No acute displaced fracture identified.     Impression:     Your total calcium score is 153.6.  Moderate to high coronary heart disease risk.         Electronically signed by:Avelino Barrios MD  Date:                                            03/21/2024  Time:                                           12:22      PAST MEDICAL HISTORY:     Past Medical History:   Diagnosis Date    History of Hashimoto thyroiditis     History of nephrolithiasis     Iron deficiency anemia 02/22/2023    Moderate hypertension 02/22/2023    Paroxysmal atrial fibrillation     Peripheral neuropathy, idiopathic 08/11/2021    Sleep apnea        PAST SURGICAL HISTORY:     Past Surgical History:   Procedure Laterality Date    ablasion Left     venous- leg    BACK SURGERY  11/2020    COLONOSCOPY  02/12/2019    Done by Dr. DAVID Romero    DENTAL SURGERY      EPIDURAL STEROID INJECTION  03/2018, 09/2018    pat reported    head surgery      for subdural hematoma    MYELOGRAPHY N/A 10/07/2020    Procedure: MYELOGRAM;  Surgeon: Dosnell Diagnostic Provider;  Location: [facility] OR;  Service: Interventional Radiology;  Laterality: N/A;    PROSTATE SURGERY      RHIZOTOMY  03/2019    pat reported    sinus bone spur      TONSILLECTOMY      UMBILICAL HERNIA REPAIR             SOCIAL HISTORY:     Social History     Socioeconomic History    Marital status: Single   Tobacco Use    Smoking status: Never    Smokeless tobacco: Never   Substance and Sexual Activity    Alcohol use: No    Drug use: No    Sexual activity: Yes     Partners: Female     Birth control/protection: Condom     Social Determinants of Health     Financial Resource Strain: Low Risk  (9/4/2024)    Overall Financial Resource Strain (CARDIA)     Difficulty of Paying Living Expenses: Not hard at all   Food Insecurity: No Food Insecurity (9/4/2024)    Hunger Vital Sign     Worried About Running Out of Food in the Last Year: Never true     Ran Out of Food in the Last Year: Never true   Physical Activity: Insufficiently Active (9/4/2024)    Exercise Vital Sign     Days of Exercise per Week: 1 day     Minutes of Exercise per Session: 40 min   Stress: Stress  "Concern Present (9/4/2024)    [employer] - Occupational Stress Questionnaire     Feeling of Stress : To some extent   Housing Stability: Unknown (9/4/2024)    Housing Stability Vital Sign     Unable to Pay for Housing in the Last Year: No       FAMILY HISTORY:     Family History   Problem Relation Name Age of Onset    Diabetes Mother      Cancer Sister         REVIEW OF SYSTEMS:   Review of Systems   Constitutional: Negative.   HENT: Negative.     Eyes: Negative.    Respiratory: Negative.     Endocrine: Negative.    Hematologic/Lymphatic: Negative.    Skin: Negative.    Musculoskeletal: Negative.    Gastrointestinal: Negative.    Genitourinary: Negative.    Neurological: Negative.    Psychiatric/Behavioral: Negative.     Allergic/Immunologic: Negative.        A 10 point review of systems was performed and all the pertinent positives have been mentioned. Rest of review of systems was negative.        PHYSICAL EXAM:     Vitals:    09/04/24 1510   BP: 130/70   Pulse: 86   Resp: 18    Body mass index is 34.33 kg/m².  Weight: 105.4 kg (232 lb 7.6 oz)   Height: 5' 9" (175.3 cm)     Physical Exam  Vitals reviewed.   Constitutional:       Appearance: He is well-developed.   HENT:      Head: Normocephalic.   Eyes:      Conjunctiva/sclera: Conjunctivae normal.      Pupils: Pupils are equal, round, and reactive to light.   Cardiovascular:      Rate and Rhythm: Normal rate and regular rhythm.      Heart sounds: Normal heart sounds.   Pulmonary:      Effort: Pulmonary effort is normal.      Breath sounds: Normal breath sounds.   Abdominal:      General: Bowel sounds are normal.      Palpations: Abdomen is soft.   Musculoskeletal:      Cervical back: Normal range of motion and neck supple.   Skin:     General: Skin is warm.   Neurological:      Mental Status: He is alert and oriented to person, place, and time.           DATA:     Laboratory:  CBC:  Recent Labs   Lab 02/26/24  1132 05/05/24  1232 " 07/24/24  1254   WBC 4.15 5.49 5.73   Hemoglobin 13.6 L 12.7 L 13.1 L   Hematocrit 41.0 38.3 L 40.0   Platelets 233 202 246       CHEMISTRIES:  Recent Labs   Lab 02/16/22  1007 08/15/22  0939 02/26/24  1132 05/05/24  1232 07/24/24  1254   Glucose 89   < > 100 116 H 88   Sodium 143   < > 137 140 140   Potassium 4.2   < > 4.4 4.0 4.1   BUN 16   < > 17 11 17   Creatinine 0.70 L   < > 0.98 0.8 0.81   eGFR if non  96  --   --   --   --    Calcium 9.1   < > 9.5 9.0 9.3    < > = values in this interval not displayed.       CARDIAC BIOMARKERS:  Recent Labs   Lab 05/05/24  1232   Troponin I <0.006       COAGS:        LIPIDS/LFTS:  Recent Labs   Lab 11/10/22  1409 02/16/23  1245 02/26/24  1132 05/05/24  1232 07/24/24  1254   Cholesterol 156 137 152  --   --    Triglycerides 516 H 200 H 272 H  --   --    HDL 30 L 37 L 49  --   --    LDL Cholesterol Invalid, Trig>400.0 60.0 L 48.6 L  --   --    Non-HDL Cholesterol 126 100 103  --   --    AST 62 H 58 52 37 49   ALT 41 45 38 31 35       Hemoglobin A1C   Date Value Ref Range Status   05/23/2018 5.8 (H) 4.8 - 5.6 % Final     Comment:              Pre-diabetes: 5.7 - 6.4           Diabetes: >6.4           Glycemic control for adults with diabetes: <7.0     05/15/2017 6.0 (H) 4.8 - 5.6 % Final     Comment:              Pre-diabetes: 5.7 - 6.4           Diabetes: >6.4           Glycemic control for adults with diabetes: <7.0     10/03/2016 5.7 (H) 4.8 - 5.6 % Final     Comment:              Pre-diabetes: 5.7 - 6.4           Diabetes: >6.4           Glycemic control for adults with diabetes: <7.0         TSH  Recent Labs   Lab 02/26/24  1132 05/05/24  1232   TSH 3.940 7.373 H       The 10-year ASCVD risk score (Chanda MILES, et al., 2019) is: 20.4%    Values used to calculate the score:      Age: 74 years      Sex: Male      Is Non- : No      Diabetic: No      Tobacco smoker: No      Systolic Blood Pressure: 130 mmHg      Is BP treated: No      HDL  Cholesterol: 49 mg/dL      Total Cholesterol: 152 mg/dL       BNP    Lab Results   Component Value Date/Time    BNP 12 05/05/2024 12:32 PM         ASSESSMENT AND PLAN     Patient Active Problem List   Diagnosis    Hypothyroidism due to acquired atrophy of thyroid    Peripheral neuropathy, idiopathic    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    Benign prostatic hyperplasia with urinary retention    History of subdural hematoma    History of Hashimoto thyroiditis    Primary osteoarthritis involving multiple joints    Sleep apnea    History of nephrolithiasis    Encounter for long-term (current) use of medications    Iron deficiency anemia    Restless leg syndrome    Moderate hypertension    Screening PSA (prostate specific antigen)    Asymptomatic varicose veins of both lower extremities    Supraventricular tachycardia         ALLERGIES AND MEDICATION:     Review of patient's allergies indicates:   Allergen Reactions    Niacin      Other reaction(s): FLUSHING AND ITCHING, FLUSHING AND ITCHING with 1500mg dose    Zanaflex [tizanidine]      dizziness    Oxycodone Rash        Medication List            Accurate as of September 4, 2024  5:09 PM. If you have any questions, ask your nurse or doctor.                CONTINUE taking these medications      aspirin 81 MG EC tablet  Commonly known as: ECOTRIN     atorvastatin 20 MG tablet  Commonly known as: LIPITOR  Take 1 tablet (20 mg total) by mouth once daily.     carBAMazepine 200 mg tablet  Commonly known as: TEGRETOL     cyanocobalamin 1000 MCG tablet  Commonly known as: VITAMIN B-12     ezetimibe 10 mg tablet  Commonly known as: ZETIA  Take 1 tablet (10 mg total) by mouth once daily.     fenofibrate 160 MG Tab  TAKE ONE TABLET BY MOUTH ONE TIME DAILY     finasteride 5 mg tablet  Commonly known as: PROSCAR  Take 1 tablet (5 mg total) by mouth once daily.     IMITREX ORAL     iron polysaccharides 150 mg iron Cap  Commonly known  as: IFEREX 150  TAKE ONE CAPSULE BY MOUTH TWICE WEEKLY     levothyroxine 175 MCG tablet  Commonly known as: SYNTHROID, LEVOTHROID  Take 1 tablet (175 mcg total) by mouth once daily.     meclizine 25 mg tablet  Commonly known as: ANTIVERT  Take 1 tablet (25 mg total) by mouth 3 (three) times daily as needed for Dizziness.     OMEGA-3 FISH OIL ORAL     pramipexole 0.5 MG tablet  Commonly known as: MIRAPEX     tamsulosin 0.4 mg Cap  Commonly known as: FLOMAX  Take 1 capsule (0.4 mg total) by mouth once daily.              Orders Placed This Encounter   Procedures    Nuclear Stress - Cardiology Interpreted    Echo        Coronary calcification: Check stress test and echo     Aggressive risk factor modification.  LDL is at goal.    Lab Results   Component Value Date    LDLCALC 48.6 (L) 02/26/2024       Palpitations:  Have more or less resolved.  Has not had them for many months     Follow-up after stress testing      Thank you very much for involving me in the care of your patient.  Please do not hesitate to contact me if there are any questions.      Lexie Adams MD, FACC, [facility]  Interventional Cardiologist, [facility].           This note was dictated with the help of speech recognition software.  There might be un-intended errors and/or substitutions.

## 2024-09-20 ENCOUNTER — TELEPHONE (OUTPATIENT)
Dept: CARDIOLOGY | Facility: CLINIC | Age: 74
End: 2024-09-20
Payer: MEDICARE

## 2024-09-20 NOTE — TELEPHONE ENCOUNTER
----- Message from Marichuy Brower sent at 9/20/2024  2:32 PM CDT -----  Regarding: patient call back  Type: Patient Call Back    Who called: Self     What is the request in detail: asked for a call back to see if the doctor received his records     Can the clinic reply by [facility]? No     Would the patient rather a call back or a response via [facility]? Call     Best call back number: [phone]

## 2024-09-27 ENCOUNTER — HOSPITAL ENCOUNTER (OUTPATIENT)
Dept: CARDIOLOGY | Facility: HOSPITAL | Age: 74
Discharge: HOME | End: 2024-09-27
Attending: INTERNAL MEDICINE
Payer: MEDICARE

## 2024-09-27 DIAGNOSIS — I25.10 CORONARY ARTERY CALCIFICATION: Primary | ICD-10-CM

## 2024-09-27 DIAGNOSIS — I25.84 CORONARY ARTERY CALCIFICATION: Primary | ICD-10-CM

## 2024-09-27 DIAGNOSIS — I25.10 CORONARY ARTERY CALCIFICATION: ICD-10-CM

## 2024-09-27 DIAGNOSIS — I25.84 CORONARY ARTERY CALCIFICATION: ICD-10-CM

## 2024-09-27 LAB
AV INDEX (PROSTH): 0.93
AV MEAN GRADIENT: 4 MMHG
AV PEAK GRADIENT: 6 MMHG
AV VALVE AREA BY VELOCITY RATIO: 2.65 CM²
AV VALVE AREA: 2.99 CM²
AV VELOCITY RATIO: 0.83
CV ECHO LV RWT: 0.54 CM
DOP CALC AO PEAK VEL: 1.27 M/S
DOP CALC AO VTI: 20.8 CM
DOP CALC LVOT AREA: 3.2 CM2
DOP CALC LVOT DIAMETER: 2.02 CM
DOP CALC LVOT PEAK VEL: 1.05 M/S
DOP CALC LVOT STROKE VOLUME: 62.14 CM3
DOP CALC MV VTI: 19.5 CM
DOP CALCLVOT PEAK VEL VTI: 19.4 CM
E WAVE DECELERATION TIME: 144.67 MSEC
E/A RATIO: 0.68
E/E' RATIO: 11.82 M/S
ECHO LV POSTERIOR WALL: 1.15 CM (ref 0.6–1.1)
FRACTIONAL SHORTENING: 40 % (ref 28–44)
INTERVENTRICULAR SEPTUM: 1.17 CM (ref 0.6–1.1)
IVC DIAMETER: 1.44 CM
IVRT: 102.76 MSEC
LA MAJOR: 5.61 CM
LA MINOR: 5.62 CM
LA WIDTH: 4.3 CM
LEFT ATRIUM SIZE: 3.73 CM
LEFT ATRIUM VOLUME: 76.55 CM3
LEFT INTERNAL DIMENSION IN SYSTOLE: 2.56 CM (ref 2.1–4)
LEFT VENTRICLE DIASTOLIC VOLUME: 80.47 ML
LEFT VENTRICLE SYSTOLIC VOLUME: 23.68 ML
LEFT VENTRICULAR INTERNAL DIMENSION IN DIASTOLE: 4.24 CM (ref 3.5–6)
LEFT VENTRICULAR MASS: 172.05 G
LV LATERAL E/E' RATIO: 10.83 M/S
LV SEPTAL E/E' RATIO: 13 M/S
LVED V (TEICH): 80.47 ML
LVES V (TEICH): 23.68 ML
LVOT MG: 2.45 MMHG
LVOT MV: 0.73 CM/S
MV MEAN GRADIENT: 2 MMHG
MV PEAK A VEL: 0.96 M/S
MV PEAK E VEL: 0.65 M/S
MV PEAK GRADIENT: 4 MMHG
MV STENOSIS PRESSURE HALF TIME: 41.95 MS
MV VALVE AREA BY CONTINUITY EQUATION: 3.19 CM2
MV VALVE AREA P 1/2 METHOD: 5.24 CM2
OHS CV RV/LV RATIO: 0.89 CM
PISA TR MAX VEL: 2.19 M/S
PV PEAK GRADIENT: 4 MMHG
PV PEAK VELOCITY: 1.05 M/S
RA MAJOR: 5.37 CM
RA PRESSURE ESTIMATED: 3 MMHG
RA WIDTH: 3.8 CM
RIGHT VENTRICLE DIASTOLIC BASEL DIMENSION: 3.8 CM
RIGHT VENTRICULAR END-DIASTOLIC DIMENSION: 3.78 CM
RV TB RVSP: 5 MMHG
RV TISSUE DOPPLER FREE WALL SYSTOLIC VELOCITY 1 (APICAL 4 CHAMBER VIEW): 15.33 CM/S
SINUS: 3.92 CM
STJ: 3.3 CM
TDI LATERAL: 0.06 M/S
TDI SEPTAL: 0.05 M/S
TDI: 0.06 M/S
TR MAX PG: 19 MMHG
TRICUSPID ANNULAR PLANE SYSTOLIC EXCURSION: 1.98 CM
TV REST PULMONARY ARTERY PRESSURE: 22 MMHG

## 2024-09-27 PROCEDURE — 93306 TTE W/DOPPLER COMPLETE: CPT | Mod: 26,,, | Performed by: INTERNAL MEDICINE

## 2024-09-27 PROCEDURE — 93306 TTE W/DOPPLER COMPLETE: CPT

## 2024-09-30 ENCOUNTER — TELEPHONE (OUTPATIENT)
Dept: CARDIOLOGY | Facility: CLINIC | Age: 74
End: 2024-09-30

## 2024-09-30 ENCOUNTER — OFFICE VISIT (OUTPATIENT)
Dept: CARDIOLOGY | Facility: CLINIC | Age: 74
End: 2024-09-30
Payer: MEDICARE

## 2024-09-30 VITALS
HEART RATE: 86 BPM | BODY MASS INDEX: 34.53 KG/M2 | DIASTOLIC BLOOD PRESSURE: 72 MMHG | RESPIRATION RATE: 18 BRPM | SYSTOLIC BLOOD PRESSURE: 116 MMHG | HEIGHT: 69 IN | WEIGHT: 233.13 LBS | OXYGEN SATURATION: 96 %

## 2024-09-30 DIAGNOSIS — E78.5 DYSLIPIDEMIA: ICD-10-CM

## 2024-09-30 DIAGNOSIS — I25.10 CORONARY ARTERY CALCIFICATION: Primary | ICD-10-CM

## 2024-09-30 DIAGNOSIS — I10 HYPERTENSION, UNSPECIFIED TYPE: ICD-10-CM

## 2024-09-30 DIAGNOSIS — I25.10 CORONARY ARTERY DISEASE, UNSPECIFIED VESSEL OR LESION TYPE, UNSPECIFIED WHETHER ANGINA PRESENT, UNSPECIFIED WHETHER NATIVE OR TRANSPLANTED HEART: ICD-10-CM

## 2024-09-30 PROCEDURE — 3008F BODY MASS INDEX DOCD: CPT | Mod: CPTII,S$GLB,, | Performed by: INTERNAL MEDICINE

## 2024-09-30 PROCEDURE — 3078F DIAST BP <80 MM HG: CPT | Mod: CPTII,S$GLB,, | Performed by: INTERNAL MEDICINE

## 2024-09-30 PROCEDURE — 1101F PT FALLS ASSESS-DOCD LE1/YR: CPT | Mod: CPTII,S$GLB,, | Performed by: INTERNAL MEDICINE

## 2024-09-30 PROCEDURE — 1126F AMNT PAIN NOTED NONE PRSNT: CPT | Mod: CPTII,S$GLB,, | Performed by: INTERNAL MEDICINE

## 2024-09-30 PROCEDURE — 3074F SYST BP LT 130 MM HG: CPT | Mod: CPTII,S$GLB,, | Performed by: INTERNAL MEDICINE

## 2024-09-30 PROCEDURE — 1159F MED LIST DOCD IN RCRD: CPT | Mod: CPTII,S$GLB,, | Performed by: INTERNAL MEDICINE

## 2024-09-30 PROCEDURE — 99214 OFFICE O/P EST MOD 30 MIN: CPT | Mod: S$GLB,,, | Performed by: INTERNAL MEDICINE

## 2024-09-30 PROCEDURE — 3288F FALL RISK ASSESSMENT DOCD: CPT | Mod: CPTII,S$GLB,, | Performed by: INTERNAL MEDICINE

## 2024-09-30 PROCEDURE — 93000 ELECTROCARDIOGRAM COMPLETE: CPT | Mod: S$GLB,,, | Performed by: INTERNAL MEDICINE

## 2024-09-30 PROCEDURE — 99999 PR PBB SHADOW E&M-EST. PATIENT-LVL IV: CPT | Mod: PBBFAC,,, | Performed by: INTERNAL MEDICINE

## 2024-09-30 RX ORDER — GABAPENTIN 300 MG/1
300 CAPSULE ORAL 3 TIMES DAILY
COMMUNITY

## 2024-09-30 NOTE — TELEPHONE ENCOUNTER
----- Message from Lexie Adams MD sent at 9/27/2024  7:41 PM CDT -----  Stress EKG ordered. Get that done plz  ----- Message -----  From: Monique Goodwin MA  Sent: 9/25/2024   3:49 PM CDT  To: Lexie Adams MD    Patient stress test denied by insurance, is there something else you would like? Please advise.  ----- Message -----  From: Magali Palacios  Sent: 9/25/2024   2:09 PM CDT  To: Angie Owen Staff    Type: Patient Call Back    Who called: self     What is the request in detail: patient stress was denial by insurance he's requesting to speak with staff. Please call    Can the clinic reply by [facility]? No     Would the patient rather a call back or a response via [facility]?  call    Best call back number: [phone] ([place])      Additional Information:

## 2024-09-30 NOTE — TELEPHONE ENCOUNTER
Left message informing patient of new test and called nuclear stress. Advised patient to call office back so we can reschedule follow up. lolly

## 2024-09-30 NOTE — PROGRESS NOTES
CARDIOVASCULAR CONSULTATION    REASON FOR CONSULT:   Trell Richmond is a 74 y.o. male who presents for   Chief Complaint   Patient presents with    Results        Ordered on: 5/5/2024   Associated Dx: Palpitations; Shortness of breath   Authorizing provider: Steve Miller MD         HISTORY OF PRESENT ILLNESS:     Patient is a pleasant 74-year-old man.  Has been referred for palpitations and shortness of breath as well as dyspnea on exertion.  Denies any resting chest pains.  Did have calcium scoring done which demonstrated elevated coronary calcium as listed.  Denies orthopnea PND    Results for orders placed or performed during the hospital encounter of 05/05/24   EKG 12-lead    Collection Time: 05/05/24 11:46 AM   Result Value Ref Range    QRS Duration 94 ms    [facility] QTC Calculation 454 ms    Narrative    Test Reason : R00.2,    Vent. Rate : 086 BPM     Atrial Rate : 086 BPM     P-R Int : 202 ms          QRS Dur : 094 ms      QT Int : 380 ms       P-R-T Axes : 043 -31 001 degrees     QTc Int : 454 ms    Normal sinus rhythm  Left axis deviation  Minimal voltage criteria for LVH, may be normal variant ( R in aVL )  Possible Anterior infarct ,age undetermined  Abnormal ECG  No previous ECGs available  Confirmed by Danie Milan MD ([id number]) on 5/7/2024 9:10:32 PM    Referred By: [email or web address]   SELF           Confirmed By:Danie Milan MD       SCORE SUMMARY     Your total calcium score is 153.6.     Incidental findings: Included portions of the chest demonstrate no pericardial effusion.  The thoracic aorta appears not aneurysmal.  There are calcified granulomatous lymph nodes identified within the mediastinum and right hilum.  There is calcified granuloma within the right middle lobe.  No consolidation, pleural effusion, or pneumothorax identified of the included portions of the lungs.  No acute displaced fracture identified.     Impression:     Your total calcium score is 153.6.  Moderate to high coronary  heart disease risk.        Electronically signed by:Avelino Barrios MD  Date:                                            03/21/2024  Time:                                           12:22      Notes from September 24: Patient here for follow-up.  Stress test was rejected by his insurance.  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  No substernal chest pressure or pain.    Results for orders placed during the hospital encounter of 09/27/24    Echo    Interpretation Summary    Left Ventricle: The left ventricle is normal in size. There is mild concentric hypertrophy. There is normal systolic function with a visually estimated ejection fraction of 60 - 65%. Grade I diastolic dysfunction.    Right Ventricle: Normal right ventricular cavity size. Systolic function is normal.    Left Atrium: Left atrium is mildly dilated.    Aorta: Aortic root is mildly dilated measuring 3.92 cm.    Pulmonary Artery: The estimated pulmonary artery systolic pressure is 22 mmHg.    IVC/SVC: Normal venous pressure at 3 mmHg.        PAST MEDICAL HISTORY:     Past Medical History:   Diagnosis Date    History of Hashimoto thyroiditis     History of nephrolithiasis     Iron deficiency anemia 02/22/2023    Moderate hypertension 02/22/2023    Paroxysmal atrial fibrillation     Peripheral neuropathy, idiopathic 08/11/2021    Sleep apnea        PAST SURGICAL HISTORY:     Past Surgical History:   Procedure Laterality Date    ablasion Left     venous- leg    BACK SURGERY  11/2020    COLONOSCOPY  02/12/2019    Done by Dr. DAVID Romero    DENTAL SURGERY      EPIDURAL STEROID INJECTION  03/2018, 09/2018    pat reported    head surgery      for subdural hematoma    MYELOGRAPHY N/A 10/07/2020    Procedure: MYELOGRAM;  Surgeon: Cinda Diagnostic Provider;  Location: [facility] OR;  Service: Interventional Radiology;  Laterality: N/A;    PROSTATE SURGERY      RHIZOTOMY  03/2019    pat reported    sinus bone  spur      TONSILLECTOMY      UMBILICAL HERNIA REPAIR             SOCIAL HISTORY:     Social History     Socioeconomic History    Marital status: Single   Tobacco Use    Smoking status: Never    Smokeless tobacco: Never   Substance and Sexual Activity    Alcohol use: No    Drug use: No    Sexual activity: Yes     Partners: Female     Birth control/protection: Condom     Social Drivers of Health     Financial Resource Strain: Low Risk  (9/4/2024)    Overall Financial Resource Strain (CARDIA)     Difficulty of Paying Living Expenses: Not hard at all   Food Insecurity: No Food Insecurity (9/4/2024)    Hunger Vital Sign     Worried About Running Out of Food in the Last Year: Never true     Ran Out of Food in the Last Year: Never true   Physical Activity: Insufficiently Active (9/4/2024)    Exercise Vital Sign     Days of Exercise per Week: 1 day     Minutes of Exercise per Session: 40 min   Stress: Stress Concern Present (9/4/2024)    [employer] [place] - Occupational Stress Questionnaire     Feeling of Stress : To some extent   Housing Stability: Unknown (9/4/2024)    Housing Stability Vital Sign     Unable to Pay for Housing in the Last Year: No       FAMILY HISTORY:     Family History   Problem Relation Name Age of Onset    Diabetes Mother      Cancer Sister         REVIEW OF SYSTEMS:   Review of Systems   Constitutional: Negative.   HENT: Negative.     Eyes: Negative.    Respiratory: Negative.     Endocrine: Negative.    Hematologic/Lymphatic: Negative.    Skin: Negative.    Musculoskeletal: Negative.    Gastrointestinal: Negative.    Genitourinary: Negative.    Neurological: Negative.    Psychiatric/Behavioral: Negative.     Allergic/Immunologic: Negative.        A 10 point review of systems was performed and all the pertinent positives have been mentioned. Rest of review of systems was negative.        PHYSICAL EXAM:     Vitals:    09/30/24 0948   BP: 116/72   Pulse: 86   Resp: 18    Body mass  "index is 34.43 kg/m².  Weight: 105.7 kg (233 lb 2.2 oz)   Height: 5' 9" (175.3 cm)     Physical Exam  Vitals reviewed.   Constitutional:       Appearance: He is well-developed.   HENT:      Head: Normocephalic.   Eyes:      Conjunctiva/sclera: Conjunctivae normal.      Pupils: Pupils are equal, round, and reactive to light.   Cardiovascular:      Rate and Rhythm: Normal rate and regular rhythm.      Heart sounds: Normal heart sounds.   Pulmonary:      Effort: Pulmonary effort is normal.      Breath sounds: Normal breath sounds.   Abdominal:      General: Bowel sounds are normal.      Palpations: Abdomen is soft.   Musculoskeletal:      Cervical back: Normal range of motion and neck supple.   Skin:     General: Skin is warm.   Neurological:      Mental Status: He is alert and oriented to person, place, and time.           DATA:     Laboratory:  CBC:  Recent Labs   Lab 02/26/24  1132 05/05/24  1232 07/24/24  1254   WBC 4.15 5.49 5.73   Hemoglobin 13.6 L 12.7 L 13.1 L   Hematocrit 41.0 38.3 L 40.0   Platelets 233 202 246       CHEMISTRIES:  Recent Labs   Lab 02/16/22  1007 08/15/22  0939 02/26/24  1132 05/05/24  1232 07/24/24  1254   Glucose 89   < > 100 116 H 88   Sodium 143   < > 137 140 140   Potassium 4.2   < > 4.4 4.0 4.1   BUN 16   < > 17 11 17   Creatinine 0.70 L   < > 0.98 0.8 0.81   eGFR if non  96  --   --   --   --    Calcium 9.1   < > 9.5 9.0 9.3    < > = values in this interval not displayed.       CARDIAC BIOMARKERS:  Recent Labs   Lab 05/05/24  1232   Troponin I <0.006       COAGS:        LIPIDS/LFTS:  Recent Labs   Lab 11/10/22  1409 02/16/23  1245 02/26/24  1132 05/05/24  1232 07/24/24  1254   Cholesterol 156 137 152  --   --    Triglycerides 516 H 200 H 272 H  --   --    HDL 30 L 37 L 49  --   --    LDL Cholesterol Invalid, Trig>400.0 60.0 L 48.6 L  --   --    Non-HDL Cholesterol 126 100 103  --   --    AST 62 H 58 52 37 49   ALT 41 45 38 31 35       Hemoglobin A1C   Date Value Ref " Range Status   05/23/2018 5.8 (H) 4.8 - 5.6 % Final     Comment:              Pre-diabetes: 5.7 - 6.4           Diabetes: >6.4           Glycemic control for adults with diabetes: <7.0     05/15/2017 6.0 (H) 4.8 - 5.6 % Final     Comment:              Pre-diabetes: 5.7 - 6.4           Diabetes: >6.4           Glycemic control for adults with diabetes: <7.0     10/03/2016 5.7 (H) 4.8 - 5.6 % Final     Comment:              Pre-diabetes: 5.7 - 6.4           Diabetes: >6.4           Glycemic control for adults with diabetes: <7.0         TSH  Recent Labs   Lab 02/26/24  1132 05/05/24  1232   TSH 3.940 7.373 H       The 10-year ASCVD risk score (Chanda MILES, et al., 2019) is: 17%    Values used to calculate the score:      Age: 74 years      Sex: Male      Is Non- : No      Diabetic: No      Tobacco smoker: No      Systolic Blood Pressure: 116 mmHg      Is BP treated: No      HDL Cholesterol: 49 mg/dL      Total Cholesterol: 152 mg/dL       BNP    Lab Results   Component Value Date/Time    BNP 12 05/05/2024 12:32 PM         ASSESSMENT AND PLAN     Patient Active Problem List   Diagnosis    Hypothyroidism due to acquired atrophy of thyroid    Peripheral neuropathy, idiopathic    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    Benign prostatic hyperplasia with urinary retention    History of subdural hematoma    History of Hashimoto thyroiditis    Primary osteoarthritis involving multiple joints    Sleep apnea    History of nephrolithiasis    Encounter for long-term (current) use of medications    Iron deficiency anemia    Restless leg syndrome    Moderate hypertension    Screening PSA (prostate specific antigen)    Asymptomatic varicose veins of both lower extremities    Supraventricular tachycardia         ALLERGIES AND MEDICATION:     Review of patient's allergies indicates:   Allergen Reactions    Niacin      Other reaction(s): FLUSHING AND ITCHING, FLUSHING  AND ITCHING with 1500mg dose    Zanaflex [tizanidine]      dizziness    Oxycodone Rash        Medication List            Accurate as of September 30, 2024 11:05 AM. If you have any questions, ask your nurse or doctor.                CONTINUE taking these medications      aspirin 81 MG EC tablet  Commonly known as: ECOTRIN     atorvastatin 20 MG tablet  Commonly known as: LIPITOR  Take 1 tablet (20 mg total) by mouth once daily.     carBAMazepine 200 mg tablet  Commonly known as: TEGRETOL     cyanocobalamin 1000 MCG tablet  Commonly known as: VITAMIN B-12     ezetimibe 10 mg tablet  Commonly known as: ZETIA  Take 1 tablet (10 mg total) by mouth once daily.     fenofibrate 160 MG Tab  TAKE ONE TABLET BY MOUTH ONE TIME DAILY     finasteride 5 mg tablet  Commonly known as: PROSCAR  Take 1 tablet (5 mg total) by mouth once daily.     gabapentin 300 MG capsule  Commonly known as: NEURONTIN     IMITREX ORAL     iron polysaccharides 150 mg iron Cap  Commonly known as: IFEREX 150  TAKE ONE CAPSULE BY MOUTH TWICE WEEKLY     levothyroxine 175 MCG tablet  Commonly known as: SYNTHROID, LEVOTHROID  Take 1 tablet (175 mcg total) by mouth once daily.     meclizine 25 mg tablet  Commonly known as: ANTIVERT  Take 1 tablet (25 mg total) by mouth 3 (three) times daily as needed for Dizziness.     OMEGA-3 FISH OIL ORAL     pramipexole 0.5 MG tablet  Commonly known as: MIRAPEX     tamsulosin 0.4 mg Cap  Commonly known as: FLOMAX  Take 1 capsule (0.4 mg total) by mouth once daily.              Orders Placed This Encounter   Procedures    Nuclear Stress - Cardiology Interpreted    IN OFFICE EKG 12-LEAD (to Damon)        Coronary calcification:  Unfortunately his stress test was canceled by his insurance.  I have requested another nuclear stress test with exercise on the patient because of coronary calcification and also because of baseline ST abnormalities on his EKG.  Which reduces the sensitivity and specificity of a regular exercise  stress test.  A nuclear stress test has been ordered for these reasons and also because he has high coronary calcification     Aggressive risk factor modification.  LDL is at goal.    Lab Results   Component Value Date    LDLCALC 48.6 (L) 02/26/2024       Palpitations:  Have more or less resolved.  Has not had them for many months     Follow-up after stress testing      Thank you very much for involving me in the care of your patient.  Please do not hesitate to contact me if there are any questions.      Lexie Adams MD, FACC, [facility]  Interventional Cardiologist, [facility].           This note was dictated with the help of speech recognition software.  There might be un-intended errors and/or substitutions.

## 2024-10-01 LAB
OHS QRS DURATION: 102 MS
OHS QTC CALCULATION: 439 MS

## 2024-10-07 ENCOUNTER — TELEPHONE (OUTPATIENT)
Dept: CARDIOLOGY | Facility: CLINIC | Age: 74
End: 2024-10-07
Payer: MEDICARE

## 2024-10-07 NOTE — TELEPHONE ENCOUNTER
----- Message from Heather sent at 10/7/2024 12:39 PM CDT -----  Who Called: Self      Who Left Message for Patient: Monique      Does the patient know what this is regarding?: Treadmill stress test      Would the patient rather a call back or a response via [facility]? Call Back      Best Call Back Number: [phone]      Additional Information:

## 2024-11-18 ENCOUNTER — HOSPITAL ENCOUNTER (OUTPATIENT)
Dept: CARDIOLOGY | Facility: HOSPITAL | Age: 74
Discharge: HOME | End: 2024-11-18
Attending: INTERNAL MEDICINE
Payer: MEDICARE

## 2024-11-18 DIAGNOSIS — I25.10 CORONARY ARTERY CALCIFICATION: ICD-10-CM

## 2024-11-18 LAB
CV STRESS BASE HR: 81 BPM
DIASTOLIC BLOOD PRESSURE: 82 MMHG
OHS CV CPX 1 MINUTE RECOVERY HEART RATE: 123 BPM
OHS CV CPX 85 PERCENT MAX PREDICTED HEART RATE MALE: 125
OHS CV CPX ESTIMATED METS: 7
OHS CV CPX MAX PREDICTED HEART RATE: 147
OHS CV CPX PATIENT IS FEMALE: 0
OHS CV CPX PATIENT IS MALE: 1
OHS CV CPX PEAK DIASTOLIC BLOOD PRESSURE: 87 MMHG
OHS CV CPX PEAK HEAR RATE: 139 BPM
OHS CV CPX PEAK RATE PRESSURE PRODUCT: NORMAL
OHS CV CPX PEAK SYSTOLIC BLOOD PRESSURE: 217 MMHG
OHS CV CPX PERCENT MAX PREDICTED HEART RATE ACHIEVED: 95
OHS CV CPX RATE PRESSURE PRODUCT PRESENTING: NORMAL
STRESS ECHO POST EXERCISE DUR MIN: 5 MINUTES
STRESS ECHO POST EXERCISE DUR SEC: 59 SECONDS
SYSTOLIC BLOOD PRESSURE: 139 MMHG

## 2024-11-18 PROCEDURE — 93016 CV STRESS TEST SUPVJ ONLY: CPT | Mod: ,,, | Performed by: INTERNAL MEDICINE

## 2024-11-18 PROCEDURE — 93018 CV STRESS TEST I&R ONLY: CPT | Mod: ,,, | Performed by: INTERNAL MEDICINE

## 2024-11-18 PROCEDURE — 93017 CV STRESS TEST TRACING ONLY: CPT

## 2024-11-19 ENCOUNTER — OFFICE VISIT (OUTPATIENT)
Dept: CARDIOLOGY | Facility: CLINIC | Age: 74
End: 2024-11-19
Payer: MEDICARE

## 2024-11-19 VITALS
HEIGHT: 69 IN | WEIGHT: 227.88 LBS | RESPIRATION RATE: 18 BRPM | HEART RATE: 83 BPM | OXYGEN SATURATION: 95 % | SYSTOLIC BLOOD PRESSURE: 114 MMHG | BODY MASS INDEX: 33.75 KG/M2 | DIASTOLIC BLOOD PRESSURE: 82 MMHG

## 2024-11-19 DIAGNOSIS — I25.10 CORONARY ARTERY DISEASE, UNSPECIFIED VESSEL OR LESION TYPE, UNSPECIFIED WHETHER ANGINA PRESENT, UNSPECIFIED WHETHER NATIVE OR TRANSPLANTED HEART: ICD-10-CM

## 2024-11-19 DIAGNOSIS — I10 HYPERTENSION, UNSPECIFIED TYPE: ICD-10-CM

## 2024-11-19 DIAGNOSIS — I25.10 CORONARY ARTERY CALCIFICATION: Primary | ICD-10-CM

## 2024-11-19 DIAGNOSIS — E78.5 DYSLIPIDEMIA: ICD-10-CM

## 2024-11-19 PROCEDURE — 3074F SYST BP LT 130 MM HG: CPT | Mod: CPTII,S$GLB,, | Performed by: INTERNAL MEDICINE

## 2024-11-19 PROCEDURE — 1125F AMNT PAIN NOTED PAIN PRSNT: CPT | Mod: CPTII,S$GLB,, | Performed by: INTERNAL MEDICINE

## 2024-11-19 PROCEDURE — 99214 OFFICE O/P EST MOD 30 MIN: CPT | Mod: S$GLB,,, | Performed by: INTERNAL MEDICINE

## 2024-11-19 PROCEDURE — 3008F BODY MASS INDEX DOCD: CPT | Mod: CPTII,S$GLB,, | Performed by: INTERNAL MEDICINE

## 2024-11-19 PROCEDURE — 1159F MED LIST DOCD IN RCRD: CPT | Mod: CPTII,S$GLB,, | Performed by: INTERNAL MEDICINE

## 2024-11-19 PROCEDURE — 3288F FALL RISK ASSESSMENT DOCD: CPT | Mod: CPTII,S$GLB,, | Performed by: INTERNAL MEDICINE

## 2024-11-19 PROCEDURE — 3079F DIAST BP 80-89 MM HG: CPT | Mod: CPTII,S$GLB,, | Performed by: INTERNAL MEDICINE

## 2024-11-19 PROCEDURE — 99999 PR PBB SHADOW E&M-EST. PATIENT-LVL IV: CPT | Mod: PBBFAC,,, | Performed by: INTERNAL MEDICINE

## 2024-11-19 PROCEDURE — 1101F PT FALLS ASSESS-DOCD LE1/YR: CPT | Mod: CPTII,S$GLB,, | Performed by: INTERNAL MEDICINE

## 2024-11-19 PROCEDURE — G2211 COMPLEX E/M VISIT ADD ON: HCPCS | Mod: S$GLB,,, | Performed by: INTERNAL MEDICINE

## 2024-11-19 NOTE — PROGRESS NOTES
CARDIOVASCULAR CONSULTATION    REASON FOR CONSULT:   Trell Richmond is a 74 y.o. male who presents for   Chief Complaint   Patient presents with    Results        Ordered on: 5/5/2024   Associated Dx: Palpitations; Shortness of breath   Authorizing provider: Steve Miller MD         HISTORY OF PRESENT ILLNESS:     Patient is a pleasant 74-year-old man.  Has been referred for palpitations and shortness of breath as well as dyspnea on exertion.  Denies any resting chest pains.  Did have calcium scoring done which demonstrated elevated coronary calcium as listed.  Denies orthopnea PND    Results for orders placed or performed in visit on 09/30/24   IN OFFICE EKG 12-LEAD (to [place])    Collection Time: 09/30/24  9:59 AM   Result Value Ref Range    QRS Duration 102 ms    [facility] QTC Calculation 439 ms    Narrative    Test Reason : I25.10,I25.84,    Vent. Rate : 082 BPM     Atrial Rate : 082 BPM     P-R Int : 218 ms          QRS Dur : 102 ms      QT Int : 376 ms       P-R-T Axes : 051 -11 006 degrees     QTc Int : 439 ms    Sinus rhythm with 1st degree A-V block  Minimal voltage criteria for LVH, may be normal variant  Septal infarct (cited on or before 05-MAY-2024)  Abnormal ECG  When compared with ECG of 05-MAY-2024 11:46,  Questionable change in initial forces of Septal leads  Confirmed by Donal Amezcua MD ([id number]) on 10/1/2024 6:55:23 PM    Referred By:  MEAGAN           Confirmed By:Donal Amezcua MD       SCORE SUMMARY     Your total calcium score is 153.6.     Incidental findings: Included portions of the chest demonstrate no pericardial effusion.  The thoracic aorta appears not aneurysmal.  There are calcified granulomatous lymph nodes identified within the mediastinum and right hilum.  There is calcified granuloma within the right middle lobe.  No consolidation, pleural effusion, or pneumothorax identified of the included portions of the lungs.  No acute displaced fracture identified.     Impression:     Your  total calcium score is 153.6.  Moderate to high coronary heart disease risk.        Electronically signed by:Avelino Barrios MD  Date:                                            03/21/2024  Time:                                           12:22      Notes from September 24: Patient here for follow-up.  Stress test was rejected by his insurance.  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  No substernal chest pressure or pain.    Results for orders placed during the hospital encounter of 09/27/24    Echo    Interpretation Summary    Left Ventricle: The left ventricle is normal in size. There is mild concentric hypertrophy. There is normal systolic function with a visually estimated ejection fraction of 60 - 65%. Grade I diastolic dysfunction.    Right Ventricle: Normal right ventricular cavity size. Systolic function is normal.    Left Atrium: Left atrium is mildly dilated.    Aorta: Aortic root is mildly dilated measuring 3.92 cm.    Pulmonary Artery: The estimated pulmonary artery systolic pressure is 22 mmHg.    IVC/SVC: Normal venous pressure at 3 mmHg.    November 24:    Results for orders placed or performed in visit on 09/30/24   IN OFFICE EKG 12-LEAD (to [place])    Collection Time: 09/30/24  9:59 AM   Result Value Ref Range    QRS Duration 102 ms    OHS QTC Calculation 439 ms    Narrative    Test Reason : I25.10,I25.84,    Vent. Rate : 082 BPM     Atrial Rate : 082 BPM     P-R Int : 218 ms          QRS Dur : 102 ms      QT Int : 376 ms       P-R-T Axes : 051 -11 006 degrees     QTc Int : 439 ms    Sinus rhythm with 1st degree A-V block  Minimal voltage criteria for LVH, may be normal variant  Septal infarct (cited on or before 05-MAY-2024)  Abnormal ECG  When compared with ECG of 05-MAY-2024 11:46,  Questionable change in initial forces of Septal leads  Confirmed by Donal Amezcua MD ([id number]) on 10/1/2024 6:55:23 PM    Referred By:  MEAGAN Cardona  By:Donal Amezcua MD       Results for orders placed during the hospital encounter of 09/27/24    Echo    Interpretation Summary    Left Ventricle: The left ventricle is normal in size. There is mild concentric hypertrophy. There is normal systolic function with a visually estimated ejection fraction of 60 - 65%. Grade I diastolic dysfunction.    Right Ventricle: Normal right ventricular cavity size. Systolic function is normal.    Left Atrium: Left atrium is mildly dilated.    Aorta: Aortic root is mildly dilated measuring 3.92 cm.    Pulmonary Artery: The estimated pulmonary artery systolic pressure is 22 mmHg.    IVC/SVC: Normal venous pressure at 3 mmHg.      Results for orders placed during the hospital encounter of 11/18/24    Exercise Stress - EKG    Interpretation Summary    The ECG portion of the study is negative for ischemia.    The patient reported no chest pain during the stress test.    During stress, rare PVCs are noted.    The patient exercised for 5 minutes 59 seconds on a Justin protocol, corresponding to a functional capacity of 7 METS, achieving a peak heart rate of 139 bpm, which is 95% of the age predicted maximum heart rate. The patient experienced no angina during the test.      No results found for this or any previous visit.          PAST MEDICAL HISTORY:     Past Medical History:   Diagnosis Date    History of Hashimoto thyroiditis     History of nephrolithiasis     Iron deficiency anemia 02/22/2023    Moderate hypertension 02/22/2023    Paroxysmal atrial fibrillation     Peripheral neuropathy, idiopathic 08/11/2021    Sleep apnea        PAST SURGICAL HISTORY:     Past Surgical History:   Procedure Laterality Date    ablasion Left     venous- leg    BACK SURGERY  11/2020    COLONOSCOPY  02/12/2019    Done by Dr. DAVID Romero    DENTAL SURGERY      EPIDURAL STEROID INJECTION  03/2018, 09/2018    pat reported    head surgery      for subdural hematoma    MYELOGRAPHY N/A 10/07/2020    Procedure:  MYELOGRAM;  Surgeon: Dosc Diagnostic Provider;  Location: [facility] OR;  Service: Interventional Radiology;  Laterality: N/A;    PROSTATE SURGERY      RHIZOTOMY  03/2019    pat reported    sinus bone spur      TONSILLECTOMY      UMBILICAL HERNIA REPAIR             SOCIAL HISTORY:     Social History     Socioeconomic History    Marital status: Single   Tobacco Use    Smoking status: Never    Smokeless tobacco: Never   Substance and Sexual Activity    Alcohol use: No    Drug use: No    Sexual activity: Yes     Partners: Female     Birth control/protection: Condom     Social Drivers of Health     Financial Resource Strain: Low Risk  (9/4/2024)    Overall Financial Resource Strain (CARDIA)     Difficulty of Paying Living Expenses: Not hard at all   Food Insecurity: No Food Insecurity (9/4/2024)    Hunger Vital Sign     Worried About Running Out of Food in the Last Year: Never true     Ran Out of Food in the Last Year: Never true   Physical Activity: Insufficiently Active (9/4/2024)    Exercise Vital Sign     Days of Exercise per Week: 1 day     Minutes of Exercise per Session: 40 min   Stress: Stress Concern Present (9/4/2024)    [employer] - Occupational Stress Questionnaire     Feeling of Stress : To some extent   Housing Stability: Unknown (9/4/2024)    Housing Stability Vital Sign     Unable to Pay for Housing in the Last Year: No       FAMILY HISTORY:     Family History   Problem Relation Name Age of Onset    Diabetes Mother      Cancer Sister         REVIEW OF SYSTEMS:   Review of Systems   Constitutional: Negative.   HENT: Negative.     Eyes: Negative.    Respiratory: Negative.     Endocrine: Negative.    Hematologic/Lymphatic: Negative.    Skin: Negative.    Musculoskeletal: Negative.    Gastrointestinal: Negative.    Genitourinary: Negative.    Neurological: Negative.    Psychiatric/Behavioral: Negative.     Allergic/Immunologic: Negative.        A 10 point review of systems was  "performed and all the pertinent positives have been mentioned. Rest of review of systems was negative.        PHYSICAL EXAM:     Vitals:    11/19/24 0924   BP: 114/82   Pulse: 83   Resp: 18    Body mass index is 33.65 kg/m².  Weight: 103.4 kg (227 lb 13.5 oz)   Height: 5' 9" (175.3 cm)     Physical Exam  Vitals reviewed.   Constitutional:       Appearance: He is well-developed.   HENT:      Head: Normocephalic.   Eyes:      Conjunctiva/sclera: Conjunctivae normal.      Pupils: Pupils are equal, round, and reactive to light.   Cardiovascular:      Rate and Rhythm: Normal rate and regular rhythm.      Heart sounds: Normal heart sounds.   Pulmonary:      Effort: Pulmonary effort is normal.      Breath sounds: Normal breath sounds.   Abdominal:      General: Bowel sounds are normal.      Palpations: Abdomen is soft.   Musculoskeletal:      Cervical back: Normal range of motion and neck supple.   Skin:     General: Skin is warm.   Neurological:      Mental Status: He is alert and oriented to person, place, and time.           DATA:     Laboratory:  CBC:  Recent Labs   Lab 02/26/24  1132 05/05/24  1232 07/24/24  1254   WBC 4.15 5.49 5.73   Hemoglobin 13.6 L 12.7 L 13.1 L   Hematocrit 41.0 38.3 L 40.0   Platelets 233 202 246       CHEMISTRIES:  Recent Labs   Lab 02/16/22  1007 08/15/22  0939 02/26/24  1132 05/05/24  1232 07/24/24  1254   Glucose 89   < > 100 116 H 88   Sodium 143   < > 137 140 140   Potassium 4.2   < > 4.4 4.0 4.1   BUN 16   < > 17 11 17   Creatinine 0.70 L   < > 0.98 0.8 0.81   eGFR if non  96  --   --   --   --    Calcium 9.1   < > 9.5 9.0 9.3    < > = values in this interval not displayed.       CARDIAC BIOMARKERS:  Recent Labs   Lab 05/05/24  1232   Troponin I <0.006       COAGS:        LIPIDS/LFTS:  Recent Labs   Lab 11/10/22  1409 02/16/23  1245 02/26/24  1132 05/05/24  1232 07/24/24  1254   Cholesterol 156 137 152  --   --    Triglycerides 516 H 200 H 272 H  --   --    HDL 30 L 37 L " 49  --   --    LDL Cholesterol Invalid, Trig>400.0 60.0 L 48.6 L  --   --    Non-HDL Cholesterol 126 100 103  --   --    AST 62 H 58 52 37 49   ALT 41 45 38 31 35       Hemoglobin A1C   Date Value Ref Range Status   05/23/2018 5.8 (H) 4.8 - 5.6 % Final     Comment:              Pre-diabetes: 5.7 - 6.4           Diabetes: >6.4           Glycemic control for adults with diabetes: <7.0     05/15/2017 6.0 (H) 4.8 - 5.6 % Final     Comment:              Pre-diabetes: 5.7 - 6.4           Diabetes: >6.4           Glycemic control for adults with diabetes: <7.0     10/03/2016 5.7 (H) 4.8 - 5.6 % Final     Comment:              Pre-diabetes: 5.7 - 6.4           Diabetes: >6.4           Glycemic control for adults with diabetes: <7.0         TSH  Recent Labs   Lab 02/26/24  1132 05/05/24  1232   TSH 3.940 7.373 H       The 10-year ASCVD risk score (Chanda MILES, et al., 2019) is: 16.6%    Values used to calculate the score:      Age: 74 years      Sex: Male      Is Non- : No      Diabetic: No      Tobacco smoker: No      Systolic Blood Pressure: 114 mmHg      Is BP treated: No      HDL Cholesterol: 49 mg/dL      Total Cholesterol: 152 mg/dL       BNP    Lab Results   Component Value Date/Time    BNP 12 05/05/2024 12:32 PM         ASSESSMENT AND PLAN     Patient Active Problem List   Diagnosis    Hypothyroidism due to acquired atrophy of thyroid    Peripheral neuropathy, idiopathic    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    Benign prostatic hyperplasia with urinary retention    History of subdural hematoma    History of Hashimoto thyroiditis    Primary osteoarthritis involving multiple joints    Sleep apnea    History of nephrolithiasis    Encounter for long-term (current) use of medications    Iron deficiency anemia    Restless leg syndrome    Moderate hypertension    Screening PSA (prostate specific antigen)    Asymptomatic varicose veins of both lower  extremities    Supraventricular tachycardia         ALLERGIES AND MEDICATION:     Review of patient's allergies indicates:   Allergen Reactions    Niacin      Other reaction(s): FLUSHING AND ITCHING, FLUSHING AND ITCHING with 1500mg dose    Zanaflex [tizanidine]      dizziness    Oxycodone Rash        Medication List            Accurate as of November 19, 2024  9:40 AM. If you have any questions, ask your nurse or doctor.                CONTINUE taking these medications      aspirin 81 MG EC tablet  Commonly known as: ECOTRIN     atorvastatin 20 MG tablet  Commonly known as: LIPITOR  Take 1 tablet (20 mg total) by mouth once daily.     carBAMazepine 200 mg tablet  Commonly known as: TEGRETOL     cyanocobalamin 1000 MCG tablet  Commonly known as: VITAMIN B-12     ezetimibe 10 mg tablet  Commonly known as: ZETIA  Take 1 tablet (10 mg total) by mouth once daily.     fenofibrate 160 MG Tab  TAKE ONE TABLET BY MOUTH ONE TIME DAILY     finasteride 5 mg tablet  Commonly known as: PROSCAR  Take 1 tablet (5 mg total) by mouth once daily.     gabapentin 300 MG capsule  Commonly known as: NEURONTIN     IMITREX ORAL     iron polysaccharides 150 mg iron Cap  Commonly known as: IFEREX 150  TAKE ONE CAPSULE BY MOUTH TWICE A WEEK .     levothyroxine 175 MCG tablet  Commonly known as: SYNTHROID, LEVOTHROID  Take 1 tablet (175 mcg total) by mouth once daily.     meclizine 25 mg tablet  Commonly known as: ANTIVERT  Take 1 tablet (25 mg total) by mouth 3 (three) times daily as needed for Dizziness.     OMEGA-3 FISH OIL ORAL     pramipexole 0.5 MG tablet  Commonly known as: MIRAPEX     tamsulosin 0.4 mg Cap  Commonly known as: FLOMAX  Take 1 capsule (0.4 mg total) by mouth once daily.              No orders of the defined types were placed in this encounter.       Coronary calcification:  Unfortunately his stress test was canceled by his insurance.  EKG stress test did not show any significant ischemia.  Patient continued to be chest  pain-free.  Continue medical management     Aggressive risk factor modification.  LDL is at goal.  States has been on fibrate and Lipitor for many years.  On ezetimibe also.  Dyslipidemia being managed by his primary care physician.  No symptoms of myalgias or other side effects    Lab Results   Component Value Date    LDLCALC 48.6 (L) 02/26/2024       Palpitations:  Have more or less resolved.  Has not had them for many months    Hypertension: Well controlled at current therapy     Follow-up after 6 m      Thank you very much for involving me in the care of your patient.  Please do not hesitate to contact me if there are any questions.      Lexie Adams MD, FACC, [facility]  Interventional Cardiologist, [facility].       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      This note was dictated with the help of speech recognition software.  There might be un-intended errors and/or substitutions.

## 2024-12-13 ENCOUNTER — HOSPITAL ENCOUNTER (EMERGENCY)
Facility: HOSPITAL | Age: 74
Discharge: HOME | End: 2024-12-13
Attending: EMERGENCY MEDICINE
Payer: MEDICARE

## 2024-12-13 VITALS
DIASTOLIC BLOOD PRESSURE: 82 MMHG | WEIGHT: 230 LBS | SYSTOLIC BLOOD PRESSURE: 131 MMHG | RESPIRATION RATE: 16 BRPM | TEMPERATURE: 99 F | BODY MASS INDEX: 33.97 KG/M2 | OXYGEN SATURATION: 97 % | HEART RATE: 84 BPM

## 2024-12-13 DIAGNOSIS — B34.9 VIRAL SYNDROME: Primary | ICD-10-CM

## 2024-12-13 LAB
CTP QC/QA: YES
CTP QC/QA: YES
POC MOLECULAR INFLUENZA A AGN: NEGATIVE
POC MOLECULAR INFLUENZA B AGN: NEGATIVE
SARS-COV-2 RDRP RESP QL NAA+PROBE: NEGATIVE

## 2024-12-13 PROCEDURE — 87502 INFLUENZA DNA AMP PROBE: CPT

## 2024-12-13 PROCEDURE — 87635 SARS-COV-2 COVID-19 AMP PRB: CPT

## 2024-12-13 PROCEDURE — 99283 EMERGENCY DEPT VISIT LOW MDM: CPT

## 2024-12-13 RX ORDER — GUAIFENESIN 100 MG/5ML
100-200 SOLUTION ORAL EVERY 4 HOURS PRN
Qty: 60 ML | Refills: 0 | Status: SHIPPED | OUTPATIENT
Start: 2024-12-13 | End: 2024-12-23

## 2024-12-13 RX ORDER — CETIRIZINE HYDROCHLORIDE 10 MG/1
10 TABLET ORAL DAILY
Qty: 30 TABLET | Refills: 0 | Status: SHIPPED | OUTPATIENT
Start: 2024-12-13 | End: 2025-01-12

## 2024-12-13 RX ORDER — FLUTICASONE PROPIONATE 50 MCG
1 SPRAY, SUSPENSION (ML) NASAL 2 TIMES DAILY PRN
Qty: 15 G | Refills: 0 | Status: SHIPPED | OUTPATIENT
Start: 2024-12-13

## 2024-12-13 RX ORDER — BENZONATATE 100 MG/1
100 CAPSULE ORAL 3 TIMES DAILY PRN
Qty: 20 CAPSULE | Refills: 0 | Status: SHIPPED | OUTPATIENT
Start: 2024-12-13 | End: 2024-12-23

## 2024-12-13 NOTE — DISCHARGE INSTRUCTIONS
You were seen in the emergency department today for viral symptoms after flu vaccination. Often times this can be normal and a good sign that your immune system is functioning properly.  Please take all medications as prescribed and as we discussed.  Follow-up with specialist if instructed to do so.  It is important to remember that some problems are difficult to diagnose and may not be found during your Emergency Department visit. Be sure to follow up with your primary care doctor and review all labs/imaging/tests that were performed during this visit with them. Some labs/tests may be outside of the normal range and require non-emergent follow-up and further investigation to help diagnose/exclude/prevent complications or other medical conditions. Return to the emergency department for any new or worsening symptoms. Thank you for allowing me to care for you today, it was my pleasure. I hope you get to feeling better soon!

## 2024-12-13 NOTE — ED TRIAGE NOTES
Presents to the ED c/o cold-like symptoms since getting flu shot yesterday. Reports rhinorrhea, infrequent nonproductive cough, and sneezing. Denies body aches, chills/fevers, SOB, or CP. KLAUS.

## 2024-12-13 NOTE — ED PROVIDER NOTES
Encounter Date: 12/13/2024    SCRIBE #1 NOTE: Janet RED, tigist scribing for, and in the presence of,  Gil Sotomayor PA-C. I have scribed the following portions of the note - Other sections scribed: HPI, ROS.       History     Chief Complaint   Patient presents with    Cough     Reports coughing and sneezing after receiving flu shot on yesterday. Denies cp/SOB     Patient is a 74 y.o. male with a past medical history of hypertension, hypothyroidism, BPH, neuropathy, anemia who presents to the Emergency Department for evaluation of URI symptoms x 1 day.  Symptoms include rhinorrhea, sore throat, dry cough, and sneezing.  He has not taken any medications for the symptoms. He is vaccinated for COVID and received his flu shot yesterday around 9 AM prior to his symptoms. Denies known sick contacts.  He denies headache, chest pain, shortness of breath, abdominal pain. Denies fever, chills. Denies nausea, vomiting, diarrhea. Denies body aches, productive cough, difficulty swallowing, or otalgia.      The history is provided by the patient. No  was used.     Review of patient's allergies indicates:   Allergen Reactions    Niacin      Other reaction(s): FLUSHING AND ITCHING, FLUSHING AND ITCHING with 1500mg dose    Zanaflex [tizanidine]      dizziness    Oxycodone Rash     Past Medical History:   Diagnosis Date    History of Hashimoto thyroiditis     History of nephrolithiasis     Iron deficiency anemia 02/22/2023    Moderate hypertension 02/22/2023    Paroxysmal atrial fibrillation     Peripheral neuropathy, idiopathic 08/11/2021    Sleep apnea      Past Surgical History:   Procedure Laterality Date    ablasion Left     venous- leg    BACK SURGERY  11/2020    COLONOSCOPY  02/12/2019    Done by Dr. DAVID Romero    DENTAL SURGERY      EPIDURAL STEROID INJECTION  03/2018, 09/2018    pat reported    head surgery      for subdural hematoma    MYELOGRAPHY N/A 10/07/2020    Procedure: MYELOGRAM;   Surgeon: [facility] Diagnostic Provider;  Location: [facility] OR;  Service: Interventional Radiology;  Laterality: N/A;    PROSTATE SURGERY      RHIZOTOMY  03/2019    pat reported    sinus bone spur      TONSILLECTOMY      UMBILICAL HERNIA REPAIR       Family History   Problem Relation Name Age of Onset    Diabetes Mother      Cancer Sister       Social History     Tobacco Use    Smoking status: Never    Smokeless tobacco: Never   Substance Use Topics    Alcohol use: No    Drug use: No     Review of Systems   Constitutional:  Negative for chills and fever.   HENT:  Positive for rhinorrhea, sneezing and sore throat. Negative for congestion, ear pain, sinus pressure and trouble swallowing.    Respiratory:  Positive for cough (dry). Negative for shortness of breath.    Cardiovascular:  Negative for chest pain.   Gastrointestinal:  Negative for abdominal pain, diarrhea, nausea and vomiting.   Musculoskeletal:  Negative for myalgias.   Neurological:  Negative for headaches.       Physical Exam     Initial Vitals [12/13/24 0545]   BP Pulse Resp Temp SpO2   131/82 84 16 98.6 °F (37 °C) 97 %      MAP       --         Physical Exam    Nursing note and vitals reviewed.  Constitutional: He appears well-developed and well-nourished.   HENT:   Head: Normocephalic and atraumatic.   Right Ear: External ear normal.   Left Ear: External ear normal.   There is no posterior oropharyngeal erythema however a postnasal drip is present, no tonsillar swelling, no oropharyngeal exudates, uvula is midline. Normal dentition. No trismus.  No muffled voice. No submandibular swelling. Patient is tolerating secretions without difficulty.  Patient is speaking in full sentences on exam without difficulty.  Bilateral tympanic membranes are pearly gray without erythema, bulging, perforation.  There is no postauricular swelling, or overlying erythema or tenderness to palpation over mastoids bilaterally.    Neck: Carotid bruit is not present.   Normal range of  motion.  Cardiovascular:  Normal rate, regular rhythm, normal heart sounds and intact distal pulses.     Exam reveals no gallop and no friction rub.       No murmur heard.  Pulmonary/Chest: Breath sounds normal. No respiratory distress. He has no wheezes. He has no rhonchi. He has no rales.   Abdominal: Abdomen is soft. Bowel sounds are normal. There is no abdominal tenderness. There is no rebound and no guarding.   Musculoskeletal:         General: Normal range of motion.      Cervical back: Normal range of motion.     Neurological: He is alert and oriented to person, place, and time. GCS score is 15. GCS eye subscore is 4. GCS verbal subscore is 5. GCS motor subscore is 6.   Psychiatric: He has a normal mood and affect.         ED Course   Procedures  Labs Reviewed   SARS-COV-2 RDRP GENE       Result Value    POC Rapid COVID Negative       Acceptable Yes     POCT INFLUENZA A/B MOLECULAR    POC Molecular Influenza A Ag Negative      POC Molecular Influenza B Ag Negative       Acceptable Yes            Imaging Results    None          Medications - No data to display  Medical Decision Making  This is an emergent evaluation of a  74 y.o. male with a past medical history of hypertension, hypothyroidism, BPH, neuropathy, anemia who presents to the Emergency Department for evaluation of URI symptoms x 1 day.  Symptoms include rhinorrhea, sore throat, dry cough, and sneezing.    Patient looks well clinically. There is no posterior oropharyngeal erythema however a postnasal drip is present, no tonsillar swelling, no oropharyngeal exudates, uvula is midline. Normal dentition. No trismus.  No muffled voice. No submandibular swelling. Patient is tolerating secretions without difficulty.  Patient is speaking in full sentences on exam without difficulty.  Bilateral tympanic membranes are pearly gray without erythema, bulging, perforation.  There is no postauricular swelling, or overlying erythema  or tenderness to palpation over mastoids bilaterally. Regular rate rhythm without murmurs.  No carotid bruits appreciated on exam. Lungs are clear to auscultation bilaterally.  Abdomen is soft, nontender, non distended, with normal bowel sounds.     Differential diagnosis includes but is not limited to COVID, flu, normal immune response, other viral syndrome.  Considered but doubt pneumonia.    Workup initiated with viral swabs.  Vital signs, chart, labs, and/or imaging were all reviewed.  See ED course below and interpretations above. My overall impression is normal immune response versus viral syndrome. Will discharge home with Tessalon Perles, Robitussin, Zyrtec, Flonase with primary care follow-up. Patient is very well appearing, and in no acute distress. Vital signs are reassuring here in the emergency department, patient is afebrile, breathing comfortable, satting 97 % on room air. Patient/Caregiver is stable for discharge at this time.  Patient/Caregiver was informed of results and plan of care. Patient/Caregiver verbalized understanding of care plan. All questions and concerns were addressed. Discussed strict return precautions with the patient/caregiver. Instructed follow up with primary care provider within 1 week.      Gil Sotomayor PA-C    DISCLAIMER: This note was prepared with [employer] voice recognition transcription software. Garbled syntax, mangled pronouns, and other bizarre constructions may be attributed to that software system.       Amount and/or Complexity of Data Reviewed  Labs: ordered. Decision-making details documented in ED Course.    Risk  OTC drugs.  Prescription drug management.            Scribe Attestation:   Scribe #1: I performed the above scribed service and the documentation accurately describes the services I performed. I attest to the accuracy of the note.        ED Course as of 12/13/24 0652   Fri Dec 13, 2024   0606 BP: 131/82 [TM]   0606 Temp: 98.6 °F (37 °C) [TM]   0606  Pulse: 84 [TM]   0606 Resp: 16 [TM]   0606 SpO2: 97 % [TM]   0631 POCT COVID-19 Rapid Screening  Negative. [TM]   0631 POCT Influenza A/B Molecular  Flu negative. [TM]   0631 Suspect symptoms related to normal immune response from flu vaccination versus viral illness. [TM]   0631 Will discharge home with symptomatic medications. [TM]      ED Course User Index  [TM] Gil Sotomayor PA-C                           Clinical Impression:  Final diagnoses:  [B34.9] Viral syndrome (Primary)          ED Disposition Condition    Discharge Stable          ED Prescriptions       Medication Sig Dispense Start Date End Date Auth. Provider    benzonatate (TESSALON) 100 MG capsule Take 1 capsule (100 mg total) by mouth 3 (three) times daily as needed. 20 capsule 12/13/2024 12/23/2024 Gil Sotomayor PA-C    guaiFENesin 100 mg/5 ml (ROBITUSSIN) 100 mg/5 mL syrup Take 5-10 mLs (100-200 mg total) by mouth every 4 (four) hours as needed for Cough. 60 mL 12/13/2024 12/23/2024 Gil Sotomayor PA-C    cetirizine (ZYRTEC) 10 MG tablet Take 1 tablet (10 mg total) by mouth once daily. 30 tablet 12/13/2024 1/12/2025 Gil Sotomayor PA-C    fluticasone propionate (FLONASE) 50 mcg/actuation nasal spray 1 spray (50 mcg total) by Each Nostril route 2 (two) times daily as needed for Rhinitis. 15 g 12/13/2024 -- Gil Sotomayor PA-C          Follow-up Information       Follow up With Specialties Details Why Contact Info    [facility] [place] Emergency Medicine Go to  As needed, If symptoms worsen, or new symptoms develop [address] [facility] [place] [address] [phone]    Primary care doctor  Schedule an appointment as soon as possible for a visit in 3 days            IGil PA-C, personally performed the services described in this documentation. All medical record entries made by the scribe were at my direction and in my presence. I have reviewed the chart and  agree that the record reflects my personal performance and is accurate and complete.      DISCLAIMER: This note was prepared with [employer] voice recognition transcription software. Garbled syntax, mangled pronouns, and other bizarre constructions may be attributed to that software system.       Gil Sotomayor, CHERISE  12/13/24 0652

## 2024-12-13 NOTE — FIRST PROVIDER EVALUATION
Medical screening examination initiated.  I have conducted a focused provider triage encounter, findings are as follows:    Brief history of present illness:  ***    Vitals:    12/13/24 0545   BP: 131/82   BP Location: Right arm   Pulse: 84   Resp: 16   Temp: 98.6 °F (37 °C)   TempSrc: Oral   SpO2: 97%   Weight: 104.3 kg (230 lb)       Pertinent physical exam:  ***    Brief workup plan:  ***    Preliminary workup initiated; this workup will be continued and followed by the physician or advanced practice provider that is assigned to the patient when roomed.

## 2024-12-21 ENCOUNTER — HOSPITAL ENCOUNTER (EMERGENCY)
Facility: HOSPITAL | Age: 74
Discharge: HOME | End: 2024-12-21
Attending: EMERGENCY MEDICINE
Payer: MEDICARE

## 2024-12-21 VITALS
HEART RATE: 67 BPM | TEMPERATURE: 98 F | SYSTOLIC BLOOD PRESSURE: 107 MMHG | OXYGEN SATURATION: 95 % | BODY MASS INDEX: 34.26 KG/M2 | RESPIRATION RATE: 18 BRPM | DIASTOLIC BLOOD PRESSURE: 64 MMHG | WEIGHT: 232 LBS

## 2024-12-21 DIAGNOSIS — B96.89 BACTERIAL SINUSITIS: ICD-10-CM

## 2024-12-21 DIAGNOSIS — R05.9 COUGH: ICD-10-CM

## 2024-12-21 DIAGNOSIS — J20.9 ACUTE BRONCHITIS, UNSPECIFIED ORGANISM: Primary | ICD-10-CM

## 2024-12-21 DIAGNOSIS — J32.9 BACTERIAL SINUSITIS: ICD-10-CM

## 2024-12-21 PROCEDURE — 99284 EMERGENCY DEPT VISIT MOD MDM: CPT | Mod: 25

## 2024-12-21 RX ORDER — DOXYCYCLINE 100 MG/1
100 CAPSULE ORAL 2 TIMES DAILY
Qty: 20 CAPSULE | Refills: 0 | Status: SHIPPED | OUTPATIENT
Start: 2024-12-21 | End: 2024-12-31

## 2024-12-21 RX ORDER — PROMETHAZINE HYDROCHLORIDE AND DEXTROMETHORPHAN HYDROBROMIDE 6.25; 15 MG/5ML; MG/5ML
5 SYRUP ORAL EVERY 6 HOURS PRN
Qty: 180 ML | Refills: 0 | Status: SHIPPED | OUTPATIENT
Start: 2024-12-21 | End: 2024-12-31

## 2024-12-21 NOTE — DISCHARGE INSTRUCTIONS
Phenergan DM for cough.  Tylenol for discomfort.  Please return immediately if you get worse or if new problems develop.  Please return for fever and shortness of breath.  Doxycycline as directed

## 2024-12-21 NOTE — ED PROVIDER NOTES
Encounter Date: 12/21/2024    SCRIBE #1 NOTE: I, Nat Mendoza, am scribing for, and in the presence of,  London Willson MD. I have scribed the following portions of the note - Other sections scribed: HPI, ROS.       History     Chief Complaint   Patient presents with    Cough     Productive X a week and a half, seen prior for same symptoms, but no getting better     HPI: 74 year old male, with a PMHx of HTN, hypothyroidism, BPH, neuropathy, anemia, hashimoto thyroiditis, paroxysmal a-fib, presents to the ED for evaluation of productive green cough, symptoms onset x 10 days. Reports associated rhinorrhea, sore throat.  States he was seen at this facility on 12/13 with no improvement of symptoms. He is vaccinated for COVID and received his flu shot on 12/12. States for the last several months he's had right arm pain that radiates to his wrist; followed up with his neurologist for it. No other alleviating or exacerbating factors. Patient denies shortness of breath, chest pain, fever, chills, abdominal pain, nausea, vomiting, diarrhea, dysuria, headaches, congestion,  arm or leg trouble, eye pain, ear pain, rash, or other associated symptoms. This is the extent of the patient's complaints in the ED.     The history is provided by the patient. No  was used.     Review of patient's allergies indicates:   Allergen Reactions    Niacin      Other reaction(s): FLUSHING AND ITCHING, FLUSHING AND ITCHING with 1500mg dose    Zanaflex [tizanidine]      dizziness    Oxycodone Rash     Past Medical History:   Diagnosis Date    History of Hashimoto thyroiditis     History of nephrolithiasis     Iron deficiency anemia 02/22/2023    Moderate hypertension 02/22/2023    Paroxysmal atrial fibrillation     Peripheral neuropathy, idiopathic 08/11/2021    Sleep apnea      Past Surgical History:   Procedure Laterality Date    ablasion Left     venous- leg    BACK SURGERY  11/2020    COLONOSCOPY  02/12/2019    Done by  Dr. DAVID Romero    DENTAL SURGERY      EPIDURAL STEROID INJECTION  03/2018, 09/2018    pat reported    head surgery      for subdural hematoma    MYELOGRAPHY N/A 10/07/2020    Procedure: MYELOGRAM;  Surgeon: [facility] Diagnostic Provider;  Location: [facility] OR;  Service: Interventional Radiology;  Laterality: N/A;    PROSTATE SURGERY      RHIZOTOMY  03/2019    pat reported    sinus bone spur      TONSILLECTOMY      UMBILICAL HERNIA REPAIR       Family History   Problem Relation Name Age of Onset    Diabetes Mother      Cancer Sister       Social History     Tobacco Use    Smoking status: Never    Smokeless tobacco: Never   Substance Use Topics    Alcohol use: No    Drug use: No     Review of Systems   Constitutional:  Negative for chills, diaphoresis and fever.   HENT:  Positive for rhinorrhea and sore throat. Negative for congestion and ear pain.    Eyes:  Negative for pain and visual disturbance.   Respiratory:  Positive for cough. Negative for shortness of breath.    Cardiovascular:  Negative for chest pain.   Gastrointestinal:  Negative for abdominal pain, diarrhea, nausea and vomiting.   Genitourinary:  Negative for dysuria.   Musculoskeletal:  Positive for arthralgias (right arm pain). Negative for myalgias.   Skin:  Negative for rash.   Neurological:  Negative for headaches.       Physical Exam     Initial Vitals [12/21/24 0643]   BP Pulse Resp Temp SpO2   139/84 69 18 97.8 °F (36.6 °C) 96 %      MAP       --         Physical Exam  The patient was examined specifically for the following:   General:No significant distress, Good color, Warm and dry. Head and neck:Scalp atraumatic, Neck supple. Neurological:Appropriate conversation, Gross motor deficits. Eyes:Conjugate gaze, Clear corneas. ENT: No epistaxis. Cardiac: Regular rate and rhythm, Grossly normal heart tones. Pulmonary: Wheezing, Rales. Gastrointestinal: Abdominal tenderness, Abdominal distention. Musculoskeletal: Extremity deformity, Apparent pain with range  of motion of the joints. Skin: Rash.   The findings on examination were normal.  Lungs are clear the abdomen is soft.  Oxygen saturations are 96%.  The respiratory rate is 18 the heart rate is 69.  Is no clinical evidence of respiratory distress.  The patient is not cough during the physical exam.  ED Course   Procedures  Labs Reviewed - No data to display       Imaging Results              X-Ray Chest AP Portable (Final result)  Result time 12/21/24 07:09:03      Final result by Nehemiah Alvarez MD (12/21/24 07:09:03)                   Impression:      No acute abnormality.      Electronically signed by: Nehemiah Alvarez MD  Date:    12/21/2024  Time:    07:09               Narrative:    EXAMINATION:  XR CHEST AP PORTABLE    CLINICAL HISTORY:  Cough, unspecified    TECHNIQUE:  Single frontal view of the chest was performed.    COMPARISON:  07/29/2024    FINDINGS:  The lungs are clear with normal appearance of pulmonary vasculature. No pleural effusion. No evident pneumothorax.    The cardiac silhouette is normal in size. The hilar and mediastinal contours are unremarkable.    Bones are intact.Spine stimulator present midthoracic level.                                       Medications - No data to display  Medical Decision Making  Amount and/or Complexity of Data Reviewed  Radiology: ordered. Decision-making details documented in ED Course.    Risk  Prescription drug management.    Given the above this patient presents emergency with a 10 day history of cough.  The patient is seen and evaluated in his negative for flu and COVID.  There is no history of fever chest pain or shortness of breath.  The patient is producing green sputum.  Chest x-ray fails to reveal pneumonia.  I will discharge on Phenergan DM and Tylenol.  I will have the patient return if he gets worse or if new problems develop.  Find no evidence of congestive heart failure, bronchospasm, the patient is not febrile tachycardic or hypoxic.  Viral  syndrome and bacterial bronchitis are both considered.  The patient also has a yellow rhinorrhea.  I believe he has a bacterial sinusitis.  I will treat with doxycycline and refer to outpatient evaluation and treatment.  The patient does have sinus tenderness to percussion of the face.        Scribe Attestation:   Scribe #1: I performed the above scribed service and the documentation accurately describes the services I performed. I attest to the accuracy of the note.                           Please note that the documentation on this chart was provided by the scribe above on the date of service noted above, and that the documentation in the chart accurately reflects the work and decisions made by me alone.  Signed, Dr. Willson    Clinical Impression:  Final diagnoses:  [R05.9] Cough  [J20.9] Acute bronchitis, unspecified organism (Primary)          ED Disposition Condition    Discharge Stable          ED Prescriptions       Medication Sig Dispense Start Date End Date Auth. Provider    promethazine-dextromethorphan (PROMETHAZINE-DM) 6.25-15 mg/5 mL Syrp Take 5 mLs by mouth every 6 (six) hours as needed (cough). 180 mL 12/21/2024 12/31/2024 London Willson MD          Follow-up Information       Follow up With Specialties Details Why Contact Info    Neheimah Grier, DO Family Medicine In 1 week  [address]  [address]  [place] [address]  [phone]               London Willson MD  12/21/24 8086       London Willson MD  12/21/24 5574

## 2024-12-21 NOTE — ED TRIAGE NOTES
Pt to ED reporting productive cough producing yellow/green sputum x two weeks. Pt states that he was recently seen and treated for symptoms with no improvement. Pt denies any chest pain, SOB, n/v/d, fever or chills. Pmhx of nephrolithiasis, neuropathy, hashimoto thyroiditis, htn, and paroxysmal afib.

## 2025-01-10 ENCOUNTER — HOSPITAL ENCOUNTER (EMERGENCY)
Facility: HOSPITAL | Age: 75
Discharge: HOME | End: 2025-01-10
Attending: STUDENT IN AN ORGANIZED HEALTH CARE EDUCATION/TRAINING PROGRAM
Payer: MEDICARE

## 2025-01-10 VITALS
SYSTOLIC BLOOD PRESSURE: 132 MMHG | RESPIRATION RATE: 19 BRPM | BODY MASS INDEX: 34.07 KG/M2 | DIASTOLIC BLOOD PRESSURE: 71 MMHG | WEIGHT: 230 LBS | HEART RATE: 87 BPM | HEIGHT: 69 IN | TEMPERATURE: 99 F | OXYGEN SATURATION: 95 %

## 2025-01-10 DIAGNOSIS — R11.2 NAUSEA AND VOMITING, UNSPECIFIED VOMITING TYPE: Primary | ICD-10-CM

## 2025-01-10 DIAGNOSIS — R19.7 DIARRHEA, UNSPECIFIED TYPE: ICD-10-CM

## 2025-01-10 LAB
ALBUMIN SERPL BCP-MCNC: 3.5 G/DL (ref 3.5–5.2)
ALP SERPL-CCNC: 32 U/L (ref 40–150)
ALT SERPL W/O P-5'-P-CCNC: 31 U/L (ref 10–44)
ANION GAP SERPL CALC-SCNC: 14 MMOL/L (ref 8–16)
AST SERPL-CCNC: 45 U/L (ref 10–40)
BASOPHILS # BLD AUTO: 0.01 K/UL (ref 0–0.2)
BASOPHILS NFR BLD: 0.2 % (ref 0–1.9)
BILIRUB SERPL-MCNC: 0.5 MG/DL (ref 0.1–1)
BILIRUB UR QL STRIP: NEGATIVE
BUN SERPL-MCNC: 16 MG/DL (ref 8–23)
CALCIUM SERPL-MCNC: 8.5 MG/DL (ref 8.7–10.5)
CHLORIDE SERPL-SCNC: 107 MMOL/L (ref 95–110)
CLARITY UR: CLEAR
CO2 SERPL-SCNC: 18 MMOL/L (ref 23–29)
COLOR UR: YELLOW
CREAT SERPL-MCNC: 0.9 MG/DL (ref 0.5–1.4)
DIFFERENTIAL METHOD BLD: ABNORMAL
EOSINOPHIL # BLD AUTO: 0 K/UL (ref 0–0.5)
EOSINOPHIL NFR BLD: 0.6 % (ref 0–8)
ERYTHROCYTE [DISTWIDTH] IN BLOOD BY AUTOMATED COUNT: 13.1 % (ref 11.5–14.5)
EST. GFR  (NO RACE VARIABLE): >60 ML/MIN/1.73 M^2
GLUCOSE SERPL-MCNC: 116 MG/DL (ref 70–110)
GLUCOSE UR QL STRIP: NEGATIVE
HCT VFR BLD AUTO: 43.6 % (ref 40–54)
HGB BLD-MCNC: 14.1 G/DL (ref 14–18)
HGB UR QL STRIP: NEGATIVE
IMM GRANULOCYTES # BLD AUTO: 0.05 K/UL (ref 0–0.04)
IMM GRANULOCYTES NFR BLD AUTO: 1.1 % (ref 0–0.5)
KETONES UR QL STRIP: NEGATIVE
LEUKOCYTE ESTERASE UR QL STRIP: NEGATIVE
LIPASE SERPL-CCNC: 38 U/L (ref 4–60)
LYMPHOCYTES # BLD AUTO: 0.7 K/UL (ref 1–4.8)
LYMPHOCYTES NFR BLD: 15.4 % (ref 18–48)
MCH RBC QN AUTO: 29 PG (ref 27–31)
MCHC RBC AUTO-ENTMCNC: 32.3 G/DL (ref 32–36)
MCV RBC AUTO: 90 FL (ref 82–98)
MONOCYTES # BLD AUTO: 1.2 K/UL (ref 0.3–1)
MONOCYTES NFR BLD: 25.9 % (ref 4–15)
NEUTROPHILS # BLD AUTO: 2.7 K/UL (ref 1.8–7.7)
NEUTROPHILS NFR BLD: 56.8 % (ref 38–73)
NITRITE UR QL STRIP: NEGATIVE
NRBC BLD-RTO: 0 /100 WBC
PH UR STRIP: 6 [PH] (ref 5–8)
PLATELET # BLD AUTO: 173 K/UL (ref 150–450)
PMV BLD AUTO: 9.3 FL (ref 9.2–12.9)
POTASSIUM SERPL-SCNC: 3.3 MMOL/L (ref 3.5–5.1)
PROT SERPL-MCNC: 7.1 G/DL (ref 6–8.4)
PROT UR QL STRIP: ABNORMAL
RBC # BLD AUTO: 4.86 M/UL (ref 4.6–6.2)
SODIUM SERPL-SCNC: 139 MMOL/L (ref 136–145)
SP GR UR STRIP: 1.02 (ref 1–1.03)
T4 FREE SERPL-MCNC: 1.02 NG/DL (ref 0.71–1.51)
TSH SERPL DL<=0.005 MIU/L-ACNC: 6.4 UIU/ML (ref 0.4–4)
URN SPEC COLLECT METH UR: ABNORMAL
UROBILINOGEN UR STRIP-ACNC: NEGATIVE EU/DL
WBC # BLD AUTO: 4.67 K/UL (ref 3.9–12.7)

## 2025-01-10 PROCEDURE — 99284 EMERGENCY DEPT VISIT MOD MDM: CPT | Mod: 25

## 2025-01-10 PROCEDURE — 25000003 PHARM REV CODE 250: Performed by: STUDENT IN AN ORGANIZED HEALTH CARE EDUCATION/TRAINING PROGRAM

## 2025-01-10 PROCEDURE — 84439 ASSAY OF FREE THYROXINE: CPT | Performed by: STUDENT IN AN ORGANIZED HEALTH CARE EDUCATION/TRAINING PROGRAM

## 2025-01-10 PROCEDURE — 83690 ASSAY OF LIPASE: CPT | Performed by: STUDENT IN AN ORGANIZED HEALTH CARE EDUCATION/TRAINING PROGRAM

## 2025-01-10 PROCEDURE — 81003 URINALYSIS AUTO W/O SCOPE: CPT | Performed by: STUDENT IN AN ORGANIZED HEALTH CARE EDUCATION/TRAINING PROGRAM

## 2025-01-10 PROCEDURE — 96374 THER/PROPH/DIAG INJ IV PUSH: CPT

## 2025-01-10 PROCEDURE — 80053 COMPREHEN METABOLIC PANEL: CPT | Performed by: STUDENT IN AN ORGANIZED HEALTH CARE EDUCATION/TRAINING PROGRAM

## 2025-01-10 PROCEDURE — 85025 COMPLETE CBC W/AUTO DIFF WBC: CPT | Performed by: STUDENT IN AN ORGANIZED HEALTH CARE EDUCATION/TRAINING PROGRAM

## 2025-01-10 PROCEDURE — 63600175 PHARM REV CODE 636 W HCPCS: Performed by: STUDENT IN AN ORGANIZED HEALTH CARE EDUCATION/TRAINING PROGRAM

## 2025-01-10 PROCEDURE — 96361 HYDRATE IV INFUSION ADD-ON: CPT

## 2025-01-10 PROCEDURE — 84443 ASSAY THYROID STIM HORMONE: CPT | Performed by: STUDENT IN AN ORGANIZED HEALTH CARE EDUCATION/TRAINING PROGRAM

## 2025-01-10 RX ORDER — POTASSIUM CHLORIDE 20 MEQ/1
20 TABLET, EXTENDED RELEASE ORAL ONCE
Status: COMPLETED | OUTPATIENT
Start: 2025-01-10 | End: 2025-01-10

## 2025-01-10 RX ORDER — ONDANSETRON HYDROCHLORIDE 2 MG/ML
4 INJECTION, SOLUTION INTRAVENOUS
Status: COMPLETED | OUTPATIENT
Start: 2025-01-10 | End: 2025-01-10

## 2025-01-10 RX ORDER — ONDANSETRON 4 MG/1
4 TABLET, ORALLY DISINTEGRATING ORAL EVERY 8 HOURS PRN
Qty: 10 TABLET | Refills: 0 | Status: SHIPPED | OUTPATIENT
Start: 2025-01-10

## 2025-01-10 RX ADMIN — SODIUM CHLORIDE, POTASSIUM CHLORIDE, SODIUM LACTATE AND CALCIUM CHLORIDE 1000 ML: 600; 310; 30; 20 INJECTION, SOLUTION INTRAVENOUS at 04:01

## 2025-01-10 RX ADMIN — POTASSIUM CHLORIDE 20 MEQ: 1500 TABLET, EXTENDED RELEASE ORAL at 05:01

## 2025-01-10 RX ADMIN — ONDANSETRON 4 MG: 2 INJECTION INTRAMUSCULAR; INTRAVENOUS at 04:01

## 2025-01-10 NOTE — ED PROVIDER NOTES
Encounter Date: 1/10/2025       History     Chief Complaint   Patient presents with    Vomiting    Diarrhea     Pt reports n/v/d and PO intolerance since Monday. Pt denies fever and chills.      HPI    74-year-old male with a history of hypothyroidism on Synthroid, hypertension, paroxysmal AFib presents to ED for evaluation of diarrhea for the past 5 days of nausea and vomiting since 5:00 p.m. last night.  Patient has a several episodes of nausea vomiting since last night.  Notes that he is starting to increase his p.o. intake last evening.  He notes he has had consistent diarrhea since Monday.  Numerous family members have similar symptoms.  Denies any abdominal pain, fevers, chills, blood in his stool, chest pain, shortness of breath.    Review of patient's allergies indicates:   Allergen Reactions    Niacin      Other reaction(s): FLUSHING AND ITCHING, FLUSHING AND ITCHING with 1500mg dose    Zanaflex [tizanidine]      dizziness    Oxycodone Rash     Past Medical History:   Diagnosis Date    History of Hashimoto thyroiditis     History of nephrolithiasis     Iron deficiency anemia 02/22/2023    Moderate hypertension 02/22/2023    Paroxysmal atrial fibrillation     Peripheral neuropathy, idiopathic 08/11/2021    Sleep apnea      Past Surgical History:   Procedure Laterality Date    ablasion Left     venous- leg    BACK SURGERY  11/2020    COLONOSCOPY  02/12/2019    Done by Dr. DAVID Romero    DENTAL SURGERY      EPIDURAL STEROID INJECTION  03/2018, 09/2018    pat reported    head surgery      for subdural hematoma    MYELOGRAPHY N/A 10/07/2020    Procedure: MYELOGRAM;  Surgeon: Cinda Diagnostic Provider;  Location: [facility] OR;  Service: Interventional Radiology;  Laterality: N/A;    PROSTATE SURGERY      RHIZOTOMY  03/2019    pat reported    sinus bone spur      TONSILLECTOMY      UMBILICAL HERNIA REPAIR       Family History   Problem Relation Name Age of Onset    Diabetes Mother      Cancer Sister       Social History      Tobacco Use    Smoking status: Never    Smokeless tobacco: Never   Substance Use Topics    Alcohol use: No    Drug use: No     Review of Systems   Constitutional:  Negative for fever.   HENT:  Negative for sore throat.    Respiratory:  Negative for shortness of breath.    Cardiovascular:  Negative for chest pain.   Gastrointestinal:  Positive for diarrhea, nausea and vomiting. Negative for abdominal pain and blood in stool.   Genitourinary:  Negative for dysuria.   Musculoskeletal:  Negative for back pain.   Skin:  Negative for rash.   Neurological:  Negative for weakness.   Hematological:  Does not bruise/bleed easily.       Physical Exam     Initial Vitals [01/10/25 0419]   BP Pulse Resp Temp SpO2   124/86 99 18 98.9 °F (37.2 °C) 97 %      MAP       --         Physical Exam    Constitutional: Vital signs are normal. He appears well-developed and well-nourished.  Non-toxic appearance. He does not have a sickly appearance. He does not appear ill.   HENT:   Head: Normocephalic and atraumatic. Mouth/Throat: Mucous membranes are dry.   Eyes: EOM are normal.   Neck: Neck supple.   Cardiovascular:  Normal rate and regular rhythm.           Pulmonary/Chest: He has no wheezes. He has no rhonchi. He has no rales.   Abdominal: Abdomen is soft. Bowel sounds are normal. There is no abdominal tenderness. There is no rebound and no guarding.   Musculoskeletal:      Cervical back: Neck supple.     Neurological: He is alert.   Skin: Skin is warm and dry. No rash noted.   Psychiatric: He has a normal mood and affect.         ED Course   Procedures  Labs Reviewed   CBC W/ AUTO DIFFERENTIAL - Abnormal       Result Value    WBC 4.67      RBC 4.86      Hemoglobin 14.1      Hematocrit 43.6      MCV 90      MCH 29.0      MCHC 32.3      RDW 13.1      Platelets 173      MPV 9.3      Immature Granulocytes 1.1 (*)     Gran # (ANC) 2.7      Immature Grans (Abs) 0.05 (*)     Lymph # 0.7 (*)     Mono # 1.2 (*)     Eos # 0.0      Baso # 0.01       nRBC 0      Gran % 56.8      Lymph % 15.4 (*)     Mono % 25.9 (*)     Eosinophil % 0.6      Basophil % 0.2      Differential Method Automated     COMPREHENSIVE METABOLIC PANEL - Abnormal    Sodium 139      Potassium 3.3 (*)     Chloride 107      CO2 18 (*)     Glucose 116 (*)     BUN 16      Creatinine 0.9      Calcium 8.5 (*)     Total Protein 7.1      Albumin 3.5      Total Bilirubin 0.5      Alkaline Phosphatase 32 (*)     AST 45 (*)     ALT 31      eGFR >60      Anion Gap 14     LIPASE    Lipase 38     URINALYSIS   TSH          Imaging Results    None          Medications   lactated ringers bolus 1,000 mL (0 mLs Intravenous Stopped 1/10/25 0179)   ondansetron injection 4 mg (4 mg Intravenous Given 1/10/25 0834)   potassium chloride SA CR tablet 20 mEq (20 mEq Oral Given 1/10/25 4066)     Medical Decision Making  Amount and/or Complexity of Data Reviewed  Labs: ordered.    Risk  Prescription drug management.    Vitals reviewed and unremarkable  Patient is well-appearing in no acute distress   Does have dry mucous membranes; abdominal exam is benign  Do not feel advanced imaging of his abdomen is warranted  Suspect he has a viral illness  I have a low suspicion for bacterial cause of his diarrhea  Given a L of fluid, 4 mg of IV Zofran  Basic labs reviewed and remarkable for a bicarb of 18, potassium of 3.3, white blood cell 4.5, lipase of 30  Suspect it is mild dehydration  Given 20 mg of p.o. potassium  Patient able to tolerate p.o.   Advised to return for uncontrollable nausea and vomiting  Discharged with Zofran; advised that it is illnesses to run its course nature of the attempt to stay well hydrated in the meantime  Patient is stable for discharge    I discussed with the patient/family the diagnosis, treatment plan, indications for return to the emergency department, and for expected follow-up. The patient/family verbalized an understanding. The patient/family is asked if there are any questions or  concerns. We discuss the case, until all issues are addressed to the patient/familys satisfaction. Patient/family understands and is agreeable to the plan.   Danie Willson    DISCLAIMER: This note was prepared with [employer] voice recognition transcription software. Garbled syntax, mangled pronouns, and other bizarre constructions may be attributed to that software system.                                    Clinical Impression:  Final diagnoses:  [R11.2] Nausea and vomiting, unspecified vomiting type (Primary)  [R19.7] Diarrhea, unspecified type          ED Disposition Condition    Discharge Stable          ED Prescriptions       Medication Sig Dispense Start Date End Date Auth. Provider    ondansetron (ZOFRAN-ODT) 4 MG TbDL Take 1 tablet (4 mg total) by mouth every 8 (eight) hours as needed (nausea). 10 tablet 1/10/2025 -- Danie Willson MD          Follow-up Information    None          Danie Willson MD  01/10/25 0587

## 2025-01-10 NOTE — DISCHARGE INSTRUCTIONS
Take Zofran ODT 4 mg as needed for nausea.  This will dissolve under your tongue.  I suggest you do this 20-30 minutes before eating or drinking.  The illness otherwise has to run its course.  Can take Imodium over-the-counter for diarrhea if you would like.  Return for uncontrollable nausea and vomiting or other concerning symptoms.  Thank you.

## 2025-01-10 NOTE — ED NOTES
Pt arrive to ED c/o n/v/d since last night, reports him and his wife suddenly got a stomach bug and got concerned since he can't keep down including his medications. Pt denies blood in stool, cp, sob, abd pain or fever/chills. AAOx4.

## 2025-02-17 ENCOUNTER — HOSPITAL ENCOUNTER (EMERGENCY)
Facility: HOSPITAL | Age: 75
Discharge: HOME | End: 2025-02-17
Attending: STUDENT IN AN ORGANIZED HEALTH CARE EDUCATION/TRAINING PROGRAM
Payer: MEDICARE

## 2025-02-17 VITALS
SYSTOLIC BLOOD PRESSURE: 160 MMHG | OXYGEN SATURATION: 98 % | DIASTOLIC BLOOD PRESSURE: 78 MMHG | HEART RATE: 94 BPM | RESPIRATION RATE: 18 BRPM | WEIGHT: 232 LBS | TEMPERATURE: 98 F | BODY MASS INDEX: 34.26 KG/M2

## 2025-02-17 DIAGNOSIS — R29.6 FREQUENT FALLS: ICD-10-CM

## 2025-02-17 DIAGNOSIS — M25.511 ACUTE PAIN OF RIGHT SHOULDER: ICD-10-CM

## 2025-02-17 DIAGNOSIS — T14.90XA TRAUMA: ICD-10-CM
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
## 2025-02-17 PROCEDURE — 99284 EMERGENCY DEPT VISIT MOD MDM: CPT | Mod: 25

## 2025-02-17 PROCEDURE — 25000003 PHARM REV CODE 250

## 2025-02-17 RX ORDER — ACETAMINOPHEN 500 MG
1000 TABLET ORAL
Status: COMPLETED | OUTPATIENT
Start: 2025-02-17 | End: 2025-02-17

## 2025-02-17 RX ADMIN — ACETAMINOPHEN 1000 MG: 500 TABLET ORAL at 04:02

## 2025-02-17 NOTE — DISCHARGE INSTRUCTIONS
Diagnosis: Fall    Tests today showed:   Labs Reviewed - No data to display  CT Head Without Contrast   Final Result      No acute intracranial abnormality detected.  Age-indeterminate right caudate head lacunar infarct.      No acute cervical fracture.  Spondylitic changes.         Electronically signed by: Sarai Loera   Date:    02/17/2025   Time:    17:21      CT Cervical Spine Without Contrast   Final Result      No acute intracranial abnormality detected.  Age-indeterminate right caudate head lacunar infarct.      No acute cervical fracture.  Spondylitic changes.         Electronically signed by: Sarai Loera   Date:    02/17/2025   Time:    17:21      X-Ray Shoulder Trauma Right   Final Result      1. No acute displaced fracture or dislocation of the right shoulder.         Electronically signed by: Jake German MD   Date:    02/17/2025   Time:    17:06          Treatments you had today:   Medications   acetaminophen tablet 1,000 mg (1,000 mg Oral Given 2/17/25 3501)       Follow-Up Plan:  - Follow-up with primary care doctor within 3 - 5 days  - Additional testing and/or evaluation as directed by your primary doctor    Return to the Emergency Department for symptoms including but not limited to: worsening symptoms, shortness of breath or chest pain, vomiting with inability to hold down fluids, fevers greater than 100.4°F, passing out/fainting/unconsciousness, or other concerning symptoms.    Take ibuprofen (also called Advil, Motrin) for your pain. This medicine is available over-the-counter in 200 mg tablets.  - You may take 600 mg every 6 hours, or 800 mg every 8 hours as needed   - Do not take more than this amount, as it can cause kidney problems, bleeding in your stomach, and other serious problems.   - Do not also take naproxen (Aleve) at the same time or on the same day  - If you have heart problems or uncontrolled high blood pressure, you should not take ibuprofen for more than 3 days  without discussing with your doctor    If your pain is not controlled with ibuprofen, you may also take acetaminophen (also called Tylenol).  - You may take up to 1,000 mg of Tylenol every 6 hours as needed  - Do not take more than 4,000 mg in 24 hours (1 day) as this may cause liver damage  - Many other medicines include acetaminophen (Tylenol) such as: Norco, Vicodin, Tylenol #3, many cold medicines, etc.  - Please read all labels carefully and do not combine medicines that include acetaminophen.  - If you have a history of liver disease or drink alcohol heavily, do not take acetaminophen (Tylenol) since it can damage your liver

## 2025-02-17 NOTE — ED TRIAGE NOTES
Pt presents to ED with complaint of right shoulder pain secondary to a fall while trying to help significant other up. Pt reports hitting head on concrete. Hematoma noted to the top of the head. Pt denies loc, or blurry vision.

## 2025-02-17 NOTE — ED PROVIDER NOTES
Encounter Date: 2/17/2025       History     Chief Complaint   Patient presents with    Fall     Fell forward attempting to catch friend. Having right shoulder and right parietal pain     HPI    Trell Richmond is a 74 y.o. male with PMH of Hashimoto's thyroiditis, chronic pain, history of subdural hematoma presenting to [facility] ED for fall.  Patient endorses a fall 2 days ago on 02/15.  States that his significant other fell down and he was trying to help her out but then fell down.  States that he fell on right parietal area of his head and onto his right shoulder.  He has had worsening pain in the right shoulder.  Describes it as sharp, it is primarily exacerbated by movement.  States that his pain is well-controlled at rest.  States that he received a joint injection from his neurosurgeon yesterday but it did not improve his symptoms.  He has not abrasion to his right parietal head but no large hematoma.  Denies loss of consciousness, confusion, nausea/vomiting after falling on his head.  Denies blood thinner use.  Denies any other injuries at this time.    Review of patient's allergies indicates:   Allergen Reactions    Niacin      Other reaction(s): FLUSHING AND ITCHING, FLUSHING AND ITCHING with 1500mg dose    Zanaflex [tizanidine]      dizziness    Oxycodone Rash     Past Medical History:   Diagnosis Date    History of Hashimoto thyroiditis     History of nephrolithiasis     Iron deficiency anemia 02/22/2023    Moderate hypertension 02/22/2023    Paroxysmal atrial fibrillation     Peripheral neuropathy, idiopathic 08/11/2021    Sleep apnea      Past Surgical History:   Procedure Laterality Date    ablasion Left     venous- leg    BACK SURGERY  11/2020    COLONOSCOPY  02/12/2019    Done by Dr. DAVID Romero    DENTAL SURGERY      EPIDURAL STEROID INJECTION  03/2018, 09/2018    pat reported    head surgery      for subdural hematoma    MYELOGRAPHY N/A 10/07/2020    Procedure: MYELOGRAM;  Surgeon: Dosc Diagnostic  Provider;  Location: [facility] OR;  Service: Interventional Radiology;  Laterality: N/A;    PROSTATE SURGERY      RHIZOTOMY  03/2019    pat reported    sinus bone spur      TONSILLECTOMY      UMBILICAL HERNIA REPAIR       Family History   Problem Relation Name Age of Onset    Diabetes Mother      Cancer Sister       Social History[1]  Review of Systems   Gastrointestinal:  Negative for nausea and vomiting.   Musculoskeletal:  Positive for arthralgias.   Neurological:  Negative for dizziness, facial asymmetry, speech difficulty, weakness, light-headedness and headaches.       Physical Exam     Initial Vitals [02/17/25 1531]   BP Pulse Resp Temp SpO2   (!) 160/78 94 18 98 °F (36.7 °C) 98 %      MAP       --         Physical Exam    Nursing note and vitals reviewed.  Constitutional: Vital signs are normal. Airway: Normal. Breathing: Normal. Circulation: Normal. Pulses:Radial palpable. He is cooperative.  Non-toxic appearance. No distress.   HENT:   Head: Normocephalic and atraumatic.       Left Ear: Tympanic membrane normal.   Nose: Nose abnormal. Mouth/Throat: Mucous membranes are normal. Mucous membranes are not dry.   Eyes: Pupils: Normal pupils.   Neck: No tracheal deviation present.   Cardiovascular:  Normal rate and normal heart sounds.     Exam reveals no gallop and no friction rub.       No murmur heard.  Pulmonary/Chest: Breath sounds normal. He has no wheezes. He has no rhonchi. He has no rales.   Abdominal: Abdomen is soft. Bowel sounds are normal. There is no abdominal tenderness. The pelvis is stable. There is no rebound and no guarding.   Musculoskeletal:      Right shoulder: Bony tenderness present. No deformity. Normal range of motion.      Left shoulder: No deformity or bony tenderness.      Right upper arm: No swelling, deformity or bony tenderness.      Left upper arm: No swelling, deformity or bony tenderness.      Right elbow: No swelling or deformity.      Left elbow: No swelling or deformity.       Right forearm: No swelling, deformity or bony tenderness.      Left forearm: No swelling, deformity or bony tenderness.      Right wrist: No deformity or bony tenderness.      Left wrist: No deformity or bony tenderness.      Cervical back: No deformity or bony tenderness. Normal.      Thoracic back: Normal. No deformity or bony tenderness.      Lumbar back: Normal. No deformity or bony tenderness.      Right hip: No swelling or bony tenderness.      Left hip: No swelling or bony tenderness.      Right upper leg: No swelling, deformity or bony tenderness.      Left upper leg: No swelling, deformity or bony tenderness.      Right knee: No deformity or bony tenderness.      Left knee: No deformity or bony tenderness.      Right lower leg: No swelling, deformity or bony tenderness.      Left lower leg: No swelling, deformity or bony tenderness.      Comments: Right upper extremity:   No erythema, swelling of the right shoulder.  Patient has pain with ranging his arm but does have full range of motion despite the pain.  No sensory deficits.     Neurological: He is alert and oriented to person, place, and time. He has normal strength.   PERRL.  EOMI.  Patient is able to lift all 4 extremities against gravity.  Sensation intact in all 4 extremities.   Skin: Skin is warm, dry and intact.         ED Course   Procedures  Labs Reviewed - No data to display       Imaging Results              CT Head Without Contrast (Final result)  Result time 02/17/25 17:21:27      Final result by Sarai Loera MD (02/17/25 17:21:27)                   Impression:      No acute intracranial abnormality detected.  Age-indeterminate right caudate head lacunar infarct.    No acute cervical fracture.  Spondylitic changes.      Electronically signed by: Sarai Loera  Date:    02/17/2025  Time:    17:21               Narrative:    EXAMINATION:  CT OF THE HEAD WITHOUT    CLINICAL HISTORY:  Fell forward attempting to capture friend.  Having  right shoulder right parietal pain.    TECHNIQUE:  5 mm unenhanced axial images were obtained from the skull base to the vertex.    COMPARISON:  None.    FINDINGS:  Postsurgical changes of right frontal craniotomy are seen.  The ventricles, basal cisterns, and cortical sulci are within normal limits for patient's stated age. There is no acute intracranial hemorrhage, territorial infarct or mass effect, or midline shift.  An age-indeterminate lacune is seen in the right caudate head (series 601 coronal image 51 and series 602 image 50).  The visualized paranasal sinuses and mastoid air cells are clear.    CT cervical spine: There is satisfactory alignment of the cervical spine.  There is no acute fracture or significant subluxation.  There are marginal osteophytes seen throughout.  Intervertebral disc space narrowing is seen greatest at C6/C7.  There is facet arthrosis.                                       CT Cervical Spine Without Contrast (Final result)  Result time 02/17/25 17:21:27      Final result by Sarai Loera MD (02/17/25 17:21:27)                   Impression:      No acute intracranial abnormality detected.  Age-indeterminate right caudate head lacunar infarct.    No acute cervical fracture.  Spondylitic changes.      Electronically signed by: Sarai Loera  Date:    02/17/2025  Time:    17:21               Narrative:    EXAMINATION:  CT OF THE HEAD WITHOUT    CLINICAL HISTORY:  Fell forward attempting to capture friend.  Having right shoulder right parietal pain.    TECHNIQUE:  5 mm unenhanced axial images were obtained from the skull base to the vertex.    COMPARISON:  None.    FINDINGS:  Postsurgical changes of right frontal craniotomy are seen.  The ventricles, basal cisterns, and cortical sulci are within normal limits for patient's stated age. There is no acute intracranial hemorrhage, territorial infarct or mass effect, or midline shift.  An age-indeterminate lacune is seen in the right  caudate head (series 601 coronal image 51 and series 602 image 50).  The visualized paranasal sinuses and mastoid air cells are clear.    CT cervical spine: There is satisfactory alignment of the cervical spine.  There is no acute fracture or significant subluxation.  There are marginal osteophytes seen throughout.  Intervertebral disc space narrowing is seen greatest at C6/C7.  There is facet arthrosis.                                       X-Ray Shoulder Trauma Right (Final result)  Result time 02/17/25 17:06:24      Final result by Jake German MD (02/17/25 17:06:24)                   Impression:      1. No acute displaced fracture or dislocation of the right shoulder.      Electronically signed by: Jake German MD  Date:    02/17/2025  Time:    17:06               Narrative:    EXAMINATION:  XR SHOULDER TRAUMA 3 VIEW RIGHT    CLINICAL HISTORY:  Injury, unspecified, initial encounter    TECHNIQUE:  Three or four views of the right shoulder were performed.    COMPARISON:  None    FINDINGS:  Three views right shoulder.    The right humeral head maintains appropriate relationship with the glenoid.  The acromioclavicular joint is intact.  No acute displaced right rib fracture.  The right lung zones are clear.  Spinal stimulator noted.                                       Medications   acetaminophen tablet 1,000 mg (1,000 mg Oral Given 2/17/25 1631)     Medical Decision Making  74-year-old male, not on anticoagulation presenting after a fall.  Initially, patient is hypertensive, hemodynamically stable and well-appearing without any focal neurologic deficits.    Per [facility] head CT rule will obtain advanced imaging of the head to ensure that there is no intracranial pathology.  Since pain is exacerbated with movement and is not present at rest or when ranging his neck, low suspicion for radiculopathy extending from cervical herniation.  But based on patient's history of herniation and fall in his head, will  obtain CT imaging of the cervical spine to ensure no evidence of pathology that could contribute to spinal cord    Evidence of fracture or dislocation of the right shoulder.  CT head and cervical spine reassuring.      Patient is hemodynamically stable, well-appearing, no emergent pathology, he is appropriate for discharge at this time.  Placed sports Medicine referral for evaluation of shoulder pain.  Patient states he is going to follow up with Neurology for falls.  Placed physical therapy orders. Discussed strict return precautions, patient voices understanding.    Amount and/or Complexity of Data Reviewed  Radiology: ordered. Decision-making details documented in ED Course.    Risk  OTC drugs.               ED Course as of 02/17/25 2023 Mon Feb 17, 2025   1628 CT Head Without Contrast  Independent interpretation: No acute intracranial pathology. [ES]   1628 X-Ray Shoulder Trauma Right  Independent interpretation: Consistent with osteoarthritis but no acute fracture or dislocation. [ES]   1748 CT Head Without Contrast  No acute intracranial abnormality detected.  Age-indeterminate right caudate head lacunar infarct.     No acute cervical fracture.  Spondylitic changes.   [ES]      ED Course User Index  [ES] Naty Spain MD                           Clinical Impression:  Final diagnoses:  [T14.90XA] Trauma  [W19.XXXA] Fall, initial encounter (Primary)  [M25.511] Acute pain of right shoulder  [R29.6] Frequent falls          ED Disposition Condition    Discharge Stable          ED Prescriptions    None       Follow-up Information       Follow up With Specialties Details Why Contact Info    Nehemiah Grier, DO Family Medicine In 1 week  [address] [place] [id number] [phone]      [facility] Emergency Dept Emergency Medicine  As needed [address] [facility] [place] [address] [phone]               [1]   Social History  Tobacco  Use    Smoking status: Never    Smokeless tobacco: Never   Substance Use Topics    Alcohol use: No    Drug use: No        Naty Spain MD  Resident  02/17/25 2023

## 2025-02-21 ENCOUNTER — CLINICAL SUPPORT (OUTPATIENT)
Dept: REHABILITATION | Facility: HOSPITAL | Age: 75
End: 2025-02-21
Attending: STUDENT IN AN ORGANIZED HEALTH CARE EDUCATION/TRAINING PROGRAM
Payer: MEDICARE

## 2025-02-21 DIAGNOSIS — R29.6 FREQUENT FALLS: ICD-10-CM

## 2025-02-21 DIAGNOSIS — Z74.09 IMPAIRED FUNCTIONAL MOBILITY, BALANCE, GAIT, AND ENDURANCE: Primary | ICD-10-CM

## 2025-02-21 PROCEDURE — 97162 PT EVAL MOD COMPLEX 30 MIN: CPT | Mod: PN

## 2025-03-11 ENCOUNTER — CLINICAL SUPPORT (OUTPATIENT)
Dept: REHABILITATION | Facility: HOSPITAL | Age: 75
End: 2025-03-11
Payer: MEDICARE

## 2025-03-11 DIAGNOSIS — Z74.09 IMPAIRED FUNCTIONAL MOBILITY, BALANCE, GAIT, AND ENDURANCE: Primary | ICD-10-CM

## 2025-03-11 PROCEDURE — 97112 NEUROMUSCULAR REEDUCATION: CPT | Mod: PN

## 2025-03-11 PROCEDURE — 97110 THERAPEUTIC EXERCISES: CPT | Mod: PN

## 2025-03-13 NOTE — PROGRESS NOTES
Outpatient Rehab    Physical Therapy Evaluation    Patient Name: Feroz Richmond  MRN: [id number]  YOB: 1950  Encounter Date: 2/21/2025    Therapy Diagnosis:   Encounter Diagnoses   Name Primary?    Frequent falls     Impaired functional mobility, balance, gait, and endurance Yes     Physician: Aiyana Sharp, *    Physician Orders: Eval and Treat  Medical Diagnosis: R29.6 (ICD-10-CM) - Frequent falls    Visit # / Visits Authorized:  1 / 1   Date of Evaluation:  2/21/2025   Insurance Authorization Period: 2/17/2025 to 2/17/2026  Plan of Care Certification:  2/21/2025 to 5/1/2025      Time In: 1030   Time Out: 1125  Total Time: 55   Total Billable Time: 55 minutes       Precautions     Fall; Impaired cognition; LBP; Hx of lumbar surgery (stenosis repair), Hx of scoliosis;  R knee pain; Hx of skin cancer (multiple sites); neuropathy       Subjective   History of Present Illness  Feroz is a 74 y.o. male who reports to physical therapy with a chief concern of walking difficulties and falling.     The patient reports a medical diagnosis of R29.6 (ICD-10-CM) - Frequent falls.    Diagnostic tests related to this condition: Doppler.        History of Present Condition/Illness: Feroz reports that he began walking differently about a year ago, and attributes this to his low back pain which had begun worsening at that point. He does report recently returning to MDs for this, including spine neurosurgeon. He reports ~6 falls in the past year and more frequent near-falls. ~3 falls in the past month. Recent falls include losing balance while trying to assist girlfriend during longer distance gait, and losing balance while working in yard and lifting bag of soil. Patient also reports Hx of subdural hematoma in 2010. Upon inquiry, patient reports his walking and balance have further worsened in recent months. With inquiry regarding postural abnormalities and scoliosis, patient reports that after his lumbar surgery  for stenosis, his surgeon discussed scoliosis and possible surgery. Unfortunately, patient has not had follow-up with this MD in years. With further discussion, we discovered that he has been avoiding follow-up about this due to worry that it's worse and without chance of improvement. Patient demonstrated understanding of education for this given today.    Pain     Patient reports a current pain level of 4/10.     Location: low back pain with standing and walking         Living Arrangements  Equipment/Treatments  Other Adaptive Equipment Comment: Patient reports never using an assistive device for mobility.          Past Medical History/Physical Systems Review:   Trell Richmond  has a past medical history of History of Hashimoto thyroiditis, History of nephrolithiasis, Iron deficiency anemia, Moderate hypertension, Paroxysmal atrial fibrillation, Peripheral neuropathy, idiopathic, and Sleep apnea.    Trell Richmond  has a past surgical history that includes sinus bone spur; Umbilical hernia repair; Dental surgery; Prostate surgery; head surgery; Colonoscopy (02/12/2019); Tonsillectomy; Rhizotomy (03/2019); Epidural steroid injection (03/2018, 09/2018); Myelography (N/A, 10/07/2020); Back surgery (11/2020); and ablasion (Left).    Trell has a current medication list which includes the following prescription(s): aspirin, atorvastatin, carbamazepine, cetirizine, cyanocobalamin, ezetimibe, fenofibrate, finasteride, fluticasone propionate, gabapentin, iron polysaccharides, levothyroxine, meclizine, omega-3/dha/epa/fish oil, ondansetron, pramipexole, sumatriptan succinate, and tamsulosin.    Review of patient's allergies indicates:   Allergen Reactions    Niacin      Other reaction(s): FLUSHING AND ITCHING, FLUSHING AND ITCHING with 1500mg dose    Zanaflex [tizanidine]      dizziness    Oxycodone Rash        Objective   Posture                 Severe R lateral lean with standing and gait; slightly less-so with  sitting     Tone Details  LE tone WNL with Modified Shyanne Scale           Hip Strength - Planes of Motion   Right Strength Right Pain Left Strength Left  Pain   Flexion (L2) 4+   4+     Extension           ABduction 2+ (tested in gravity-eliminated position)   2+ (tested in gravity-eliminated position)     ADduction           Internal Rotation           External Rotation               Knee Strength   Right Strength Right Pain Left Strength Left  Pain   Flexion (S2)           Prone Flexion           Extension (L3) 5   5            Ankle/Foot Strength - Planes of Motion   Right Strength Right Pain Left Strength Left  Pain   Dorsiflexion (L4) 4+   4+     Plantar Flexion (S1)           Inversion           Eversion           Great Toe Flexion           Great Toe Extension (L5)           Lesser Toes Flexion           Lesser Toes Extension                  Coordination  Balance  Intact: Static Sitting and Static Standing  Romberg: Positive     Coordination Tests  Intact: Right Finger to Nose, Left Finger to Nose, and Left Heel to Shin  Impaired: Right Heel to Shin     Alternating foot taps: WNL           Transfers Assessment  Sit to Stand Assistance: Supervision  Sit to Stand Assistance Details: doesn't complete stand before beginning to walk      Timed Up & Go (TUG)  Time: 17 seconds     An older adult who takes >=12 seconds to complete the TUG is at risk for falling.          Ambulation Assistance Required  Surface With  Assistive Device Without Assistive Device Details   Level   Supervision      Uneven   Contact guard assist     Curb           Gait Analysis  Gait Analysis Details  -significant R lateral lean, R>L asymmetrical stance-time; sway; intermittent propulsion; reduced stride and heel-strike         Assessment & Plan   Assessment  Feroz presents with a condition of Moderate complexity.   Presentation of Symptoms: Unstable  Will Comorbidities Impact Care: Yes       Functional Limitations: Activity tolerance,  "Community integration, Decreased ambulation distance/endurance, Functional mobility, Gait limitations, Increased risk of fall, Maintaining balance  Impairments: Abnormal gait, Impaired balance, Safety issue  Personal Factors Affecting Prognosis: Physical limitations, Fear/anxiety    Patient Goal for Therapy (PT): "to walk better and get better balance"  Assessment Details: Mr. Richmond exhibits deficits as described in Subjective and Objective sections above. With examination today, he demonstrates severe right lateral lean with standing and gait, scoliosis exacerbation, impaired postural stability with positive Romberg, impaired gait mechanics, asymmetric gait, reduced stride and heel-strike, impaired gait stability with Timed Up and Go, impaired functional balance, impaired unlevel surface gait, and decreased gait endurance. Patient will benefit from skilled physical therapy to address the stated impairments and improve safe daily mobility.     Plan  From a physical therapy perspective, the patient would benefit from: Skilled Rehab Services    Planned therapy interventions include: Therapeutic exercise, Therapeutic activities, Neuromuscular re-education, Gait training, and Manual therapy.            Visit Frequency: 2 times Per Week for 8 Weeks.       This plan was discussed with Patient.   Discussion participants: Agreed Upon Plan of Care             Patient's spiritual, cultural, and educational needs considered and patient agreeable to plan of care and goals.     Education  Education was done with Patient. The patient's learning style includes Listening. The patient Verbalizes understanding.                 Goals:   Active       Long-term Goals        Patient will achieve Timed Up and Go of 13 seconds to demonstrate improved gait stability and reduced fall-risk.       Start:  03/11/25    Expected End:  05/01/25            Patient will lift medium Dynamax weighted ball from floor to waist without LOS x 5 rounds to " demonstrate improved safety with gardening.       Start:  03/11/25    Expected End:  05/01/25               Short-term Goals        Patient will achieve a negative Romberg test to demonstrate improved postural stability.       Start:  03/11/25    Expected End:  05/01/25            Patient will ambulate with upright posture, without more than minimal right lateral lean and with full stride, x 400 feet to demonstrate improved gait quality, endurance, and safety.       Start:  03/11/25    Expected End:  05/01/25                Monique Banks, PT, DPT

## 2025-03-16 NOTE — PROGRESS NOTES
Outpatient Rehab    Physical Therapy Visit    Patient Name: Feroz Richmond  MRN: [id number]  YOB: 1950  Encounter Date: 3/11/2025    Therapy Diagnosis:   Encounter Diagnosis   Name Primary?    Impaired functional mobility, balance, gait, and endurance Yes     Physician: Aiyana Sharp, *    Physician Orders: Eval and Treat  Medical Diagnosis: R29.6 (ICD-10-CM) - Frequent falls     Visit # / Visits Authorized:  1 / 20  Date of Evaluation:  2/21/2025   Insurance Authorization Period: 2/21/2025 to 5/10/2025  Plan of Care Certification:  2/21/2025 to 5/1/2025      PT/PTA: PT   Time In: 0935   Time Out: 1020  Total Time: 45   Total Billable Time: 40 minutes       Precautions     Fall; Impaired cognition; LBP; Hx of lumbar surgery (stenosis repair), Hx of scoliosis;  R knee pain; Hx of skin cancer (multiple sites); neuropathy       Subjective   Feroz reports same status as evaluation last visit.  Pain reported as 2/10. low back    Objective            Treatment:  Therapeutic Exercise  TE 1: Nu-step with B LEs and B UEs, Level 2.5- x 8 minutes  TE 2: transverse abdominis draw-ins, standing- 3-second holds- 2 x 10  Balance/Neuromuscular Re-Education  NMR 1: posture training for upright stance (out of R lateral lean), standing: with mirror- x 5 minutes, without mirror- x 2 minutes  NMR 2: transverse abdominis hold with march- x 15 each  NMR 3: gait with corrected posture (per tolerance) inside // without UE support- 9 ft x 12 rounds  NMR 4: scoliosis and pelvis re-assessment  NMR 5: patient education/ self-care home management for MD follow-ups    Time Entry(in minutes):  Neuromuscular Re-Education Time Entry: 27  Therapeutic Exercise Time Entry: 13    Assessment & Plan   Assessment: Mr. Richmond responded well to posture/ alignment training with mirror. He was able to achieve near-neutral sagittal plane posture without pain-increase. He was able to fairly isolate transverse abdominis for first trial today.  Gait carry-over of postural changes was difficult but completed with partial success.  Evaluation/Treatment Tolerance: Patient tolerated treatment well    Patient will continue to benefit from skilled outpatient physical therapy to address the deficits listed in the problem list box on initial evaluation, provide pt/family education and to maximize pt's level of independence in the home and community environment.     Patient's spiritual, cultural, and educational needs considered and patient agreeable to plan of care and goals.     Education  Education was done with Patient. The patient's learning style includes Listening. The patient Verbalizes understanding.                 Plan: Continue per POC and flowsheet, with progressions as able.    Goals:   Active       Long-term Goals        Patient will achieve Timed Up and Go of 13 seconds to demonstrate improved gait stability and reduced fall-risk.       Start:  03/11/25    Expected End:  05/01/25            Patient will lift medium Dynamax weighted ball from floor to waist without LOS x 5 rounds to demonstrate improved safety with gardening.       Start:  03/11/25    Expected End:  05/01/25               Short-term Goals        Patient will achieve a negative Romberg test to demonstrate improved postural stability.       Start:  03/11/25    Expected End:  05/01/25            Patient will ambulate with upright posture, without more than minimal right lateral lean and with full stride, x 400 feet to demonstrate improved gait quality, endurance, and safety.       Start:  03/11/25    Expected End:  05/01/25                Monique Banks, PT, DPT

## 2025-03-18 ENCOUNTER — CLINICAL SUPPORT (OUTPATIENT)
Dept: REHABILITATION | Facility: HOSPITAL | Age: 75
End: 2025-03-18
Payer: MEDICARE

## 2025-03-18 DIAGNOSIS — Z74.09 IMPAIRED FUNCTIONAL MOBILITY, BALANCE, GAIT, AND ENDURANCE: Primary | ICD-10-CM

## 2025-03-18 PROCEDURE — 97110 THERAPEUTIC EXERCISES: CPT | Mod: PN

## 2025-03-18 PROCEDURE — 97112 NEUROMUSCULAR REEDUCATION: CPT | Mod: PN

## 2025-03-21 ENCOUNTER — HOSPITAL ENCOUNTER (EMERGENCY)
Facility: HOSPITAL | Age: 75
Discharge: HOME | End: 2025-03-21
Attending: EMERGENCY MEDICINE
Payer: MEDICARE

## 2025-03-21 VITALS
BODY MASS INDEX: 34.36 KG/M2 | OXYGEN SATURATION: 99 % | HEART RATE: 90 BPM | DIASTOLIC BLOOD PRESSURE: 78 MMHG | RESPIRATION RATE: 20 BRPM | WEIGHT: 232 LBS | SYSTOLIC BLOOD PRESSURE: 126 MMHG | HEIGHT: 69 IN | TEMPERATURE: 99 F

## 2025-03-21 DIAGNOSIS — K43.9 VENTRAL HERNIA WITHOUT OBSTRUCTION OR GANGRENE: Primary | ICD-10-CM

## 2025-03-21 LAB
ALBUMIN SERPL BCP-MCNC: 4.3 G/DL (ref 3.5–5.2)
ALP SERPL-CCNC: 38 U/L (ref 40–150)
ALT SERPL W/O P-5'-P-CCNC: 36 U/L (ref 10–44)
ANION GAP SERPL CALC-SCNC: 12 MMOL/L (ref 8–16)
AST SERPL-CCNC: 45 U/L (ref 10–40)
BASOPHILS # BLD AUTO: 0.05 K/UL (ref 0–0.2)
BASOPHILS NFR BLD: 0.6 % (ref 0–1.9)
BILIRUB SERPL-MCNC: 0.4 MG/DL (ref 0.1–1)
BILIRUB UR QL STRIP: NEGATIVE
BUN SERPL-MCNC: 21 MG/DL (ref 8–23)
CALCIUM SERPL-MCNC: 9.5 MG/DL (ref 8.7–10.5)
CHLORIDE SERPL-SCNC: 108 MMOL/L (ref 95–110)
CLARITY UR: CLEAR
CO2 SERPL-SCNC: 22 MMOL/L (ref 23–29)
COLOR UR: YELLOW
CREAT SERPL-MCNC: 1 MG/DL (ref 0.5–1.4)
DIFFERENTIAL METHOD BLD: ABNORMAL
EOSINOPHIL # BLD AUTO: 0.1 K/UL (ref 0–0.5)
EOSINOPHIL NFR BLD: 0.8 % (ref 0–8)
ERYTHROCYTE [DISTWIDTH] IN BLOOD BY AUTOMATED COUNT: 13.3 % (ref 11.5–14.5)
EST. GFR  (NO RACE VARIABLE): >60 ML/MIN/1.73 M^2
GLUCOSE SERPL-MCNC: 99 MG/DL (ref 70–110)
GLUCOSE UR QL STRIP: NEGATIVE
HCT VFR BLD AUTO: 45 % (ref 40–54)
HGB BLD-MCNC: 15.4 G/DL (ref 14–18)
HGB UR QL STRIP: NEGATIVE
IMM GRANULOCYTES # BLD AUTO: 0.08 K/UL (ref 0–0.04)
IMM GRANULOCYTES NFR BLD AUTO: 0.9 % (ref 0–0.5)
KETONES UR QL STRIP: NEGATIVE
LACTATE SERPL-SCNC: 1.3 MMOL/L (ref 0.5–2.2)
LEUKOCYTE ESTERASE UR QL STRIP: NEGATIVE
LYMPHOCYTES # BLD AUTO: 1.4 K/UL (ref 1–4.8)
LYMPHOCYTES NFR BLD: 15.5 % (ref 18–48)
MCH RBC QN AUTO: 31.2 PG (ref 27–31)
MCHC RBC AUTO-ENTMCNC: 34.2 G/DL (ref 32–36)
MCV RBC AUTO: 91 FL (ref 82–98)
MONOCYTES # BLD AUTO: 1.3 K/UL (ref 0.3–1)
MONOCYTES NFR BLD: 14.8 % (ref 4–15)
NEUTROPHILS # BLD AUTO: 5.9 K/UL (ref 1.8–7.7)
NEUTROPHILS NFR BLD: 67.4 % (ref 38–73)
NITRITE UR QL STRIP: NEGATIVE
NRBC BLD-RTO: 0 /100 WBC
PH UR STRIP: 5 [PH] (ref 5–8)
PLATELET # BLD AUTO: 245 K/UL (ref 150–450)
PMV BLD AUTO: 9.3 FL (ref 9.2–12.9)
POTASSIUM SERPL-SCNC: 4.3 MMOL/L (ref 3.5–5.1)
PROT SERPL-MCNC: 8.2 G/DL (ref 6–8.4)
PROT UR QL STRIP: NEGATIVE
RBC # BLD AUTO: 4.94 M/UL (ref 4.6–6.2)
SODIUM SERPL-SCNC: 142 MMOL/L (ref 136–145)
SP GR UR STRIP: 1.02 (ref 1–1.03)
URN SPEC COLLECT METH UR: NORMAL
UROBILINOGEN UR STRIP-ACNC: NEGATIVE EU/DL
WBC # BLD AUTO: 8.78 K/UL (ref 3.9–12.7)

## 2025-03-21 PROCEDURE — 83605 ASSAY OF LACTIC ACID: CPT | Performed by: NURSE PRACTITIONER

## 2025-03-21 PROCEDURE — 99285 EMERGENCY DEPT VISIT HI MDM: CPT | Mod: 25

## 2025-03-21 PROCEDURE — 81003 URINALYSIS AUTO W/O SCOPE: CPT | Performed by: NURSE PRACTITIONER

## 2025-03-21 PROCEDURE — 25500020 PHARM REV CODE 255: Performed by: EMERGENCY MEDICINE

## 2025-03-21 PROCEDURE — 80053 COMPREHEN METABOLIC PANEL: CPT | Performed by: NURSE PRACTITIONER

## 2025-03-21 PROCEDURE — 85025 COMPLETE CBC W/AUTO DIFF WBC: CPT | Performed by: NURSE PRACTITIONER

## 2025-03-21 RX ORDER — HYDROCODONE BITARTRATE AND ACETAMINOPHEN 5; 325 MG/1; MG/1
1 TABLET ORAL EVERY 6 HOURS PRN
Qty: 12 TABLET | Refills: 0 | Status: SHIPPED | OUTPATIENT
Start: 2025-03-21

## 2025-03-21 RX ADMIN — IOHEXOL 75 ML: 350 INJECTION, SOLUTION INTRAVENOUS at 07:03

## 2025-03-21 NOTE — ED PROVIDER NOTES
Encounter Date: 3/21/2025       History     Chief Complaint   Patient presents with    Fall     Pt to ED c/o fall this evening while cutting grass landing on stomach. Has a hernia to abdomen that is now hurting. Rates pain 5/10. Denies hitting head or LOC. Denies any meds for pain PTA. Is not on blood thinners.      Chief complaint:  Abdominal hernia     History of present illness: Patient is a 74-year-old male with a history of a chronic abdominal hernia.  He states today around 14:00 he was working in the [employer] when he fell due to stubbing his toe in some grass and fell onto his abdomen.  He reports that the hernia is now out and very painful.  He denies diarrhea nausea vomiting constipation he has taken no medications for this problem.  He has never had this issue before.    The history is provided by the patient. No  was used.     Review of patient's allergies indicates:   Allergen Reactions    Niacin      Other reaction(s): FLUSHING AND ITCHING, FLUSHING AND ITCHING with 1500mg dose    Zanaflex [tizanidine]      dizziness    Oxycodone Rash     Past Medical History:   Diagnosis Date    History of Hashimoto thyroiditis     History of nephrolithiasis     Iron deficiency anemia 02/22/2023    Moderate hypertension 02/22/2023    Paroxysmal atrial fibrillation     Peripheral neuropathy, idiopathic 08/11/2021    Sleep apnea      Past Surgical History:   Procedure Laterality Date    ablasion Left     venous- leg    BACK SURGERY  11/2020    COLONOSCOPY  02/12/2019    Done by Dr. DAVID Romero    DENTAL SURGERY      EPIDURAL STEROID INJECTION  03/2018, 09/2018    pat reported    head surgery      for subdural hematoma    MYELOGRAPHY N/A 10/07/2020    Procedure: MYELOGRAM;  [facility];  Location: [facility] OR;  Service: Interventional Radiology;  Laterality: N/A;    PROSTATE SURGERY      RHIZOTOMY  03/2019    pat reported    sinus bone spur      TONSILLECTOMY      UMBILICAL HERNIA REPAIR        Family History   Problem Relation Name Age of Onset    Diabetes Mother      Cancer Sister       Social History[1]  Review of Systems   Gastrointestinal:  Positive for abdominal pain.       Physical Exam     Initial Vitals [03/21/25 1639]   BP Pulse Resp Temp SpO2   128/69 108 20 99.1 °F (37.3 °C) 96 %      MAP       --         Physical Exam    Nursing note and vitals reviewed.  Constitutional: He appears well-developed and well-nourished. He is not diaphoretic. No distress.   HENT:   Head: Normocephalic and atraumatic.   Right Ear: External ear normal.   Left Ear: External ear normal.   Nose: Nose normal.   Eyes: Pupils are equal, round, and reactive to light. Right eye exhibits no discharge. Left eye exhibits no discharge. No scleral icterus.   Neck:   Normal range of motion.  Pulmonary/Chest: No respiratory distress.   Abdominal: Abdomen is soft. Bowel sounds are normal. He exhibits distension. A hernia is present. Hernia confirmed positive in the ventral area (Easily reduced).   Musculoskeletal:         General: Normal range of motion.      Cervical back: Normal range of motion.     Neurological: He is alert and oriented to person, place, and time.   Skin: Skin is dry. Capillary refill takes less than 2 seconds.         ED Course   Procedures  Labs Reviewed   CBC W/ AUTO DIFFERENTIAL - Abnormal       Result Value    WBC 8.78      RBC 4.94      Hemoglobin 15.4      Hematocrit 45.0      MCV 91      MCH 31.2 (*)     MCHC 34.2      RDW 13.3      Platelets 245      MPV 9.3      Immature Granulocytes 0.9 (*)     Gran # (ANC) 5.9      Immature Grans (Abs) 0.08 (*)     Lymph # 1.4      Mono # 1.3 (*)     Eos # 0.1      Baso # 0.05      nRBC 0      Gran % 67.4      Lymph % 15.5 (*)     Mono % 14.8      Eosinophil % 0.8      Basophil % 0.6      Differential Method Automated     COMPREHENSIVE METABOLIC PANEL - Abnormal    Sodium 142      Potassium 4.3      Chloride 108      CO2 22 (*)     Glucose 99      BUN 21       Creatinine 1.0      Calcium 9.5      Total Protein 8.2      Albumin 4.3      Total Bilirubin 0.4      Alkaline Phosphatase 38 (*)     AST 45 (*)     ALT 36      eGFR >60      Anion Gap 12     LACTIC ACID, PLASMA    Lactate (Lactic Acid) 1.3     URINALYSIS, REFLEX TO URINE CULTURE    Specimen UA Urine, Clean Catch      Color, UA Yellow      Appearance, UA Clear      pH, UA 5.0      Specific Gravity, UA 1.025      Protein, UA Negative      Glucose, UA Negative      Ketones, UA Negative      Bilirubin (UA) Negative      Occult Blood UA Negative      Nitrite, UA Negative      Urobilinogen, UA Negative      Leukocytes, UA Negative      Narrative:     Specimen Source->Urine          Imaging Results              CT Abdomen Pelvis With IV Contrast NO Oral Contrast (Final result)  Result time 03/21/25 19:27:33      Final result by Faisal Mayers MD (03/21/25 19:27:33)                   Impression:      1. No acute intra-abdominal abnormalities identified.  No evidence of bowel obstruction, as clinically questioned.  2. Sigmoid diverticulosis with no evidence of acute diverticulitis.  3. Bilateral renal cysts with exophytic left renal hypodense lesion which measures higher than simple fluid density.  Consider future nonemergent outpatient ultrasound follow-up/surveillance to confirm and assess for cystic nature of this lesion.  4. Postsurgical changes and additional findings as detailed above.      Electronically signed by: Faisal Mayers MD  Date:    03/21/2025  Time:    19:27               Narrative:    EXAMINATION:  CT ABDOMEN PELVIS WITH IV CONTRAST    CLINICAL HISTORY:  Bowel obstruction suspected;    TECHNIQUE:  Low dose axial images, sagittal and coronal reformations were obtained from the lung bases to the pubic symphysis following the IV administration of 75 mL of Omnipaque 350 .  Oral contrast was not given.    COMPARISON:  None.    FINDINGS:  The visualized portion of the heart is unremarkable.  The lung bases  are clear.    Small subcentimeter right hepatic lobe hypodensity, questionable small cyst is noted.  Liver otherwise shows no significant focal abnormalities.  Numerous small punctate calcified granulomas are seen within the spleen.  There is no intra-or extrahepatic biliary ductal dilatation.  The gallbladder is unremarkable.  The stomach, pancreas, and adrenal glands are unremarkable.    Kidneys enhance normally with no evidence of hydronephrosis.  Several small bilateral renal hypodensities, probable cysts are seen.  Largest cyst measures 3.8 cm on the right.  Exophytic 1.8 cm hypodense lesion is seen involving the left kidney which measures higher than simple fluid density.  No abnormalities are seen along the ureteral courses.  Urinary bladder and prostate are unremarkable.    Appendix is visualized and is unremarkable.  The visualized loops of small and large bowel show no evidence of obstruction or inflammation.  There is proximal sigmoid diverticulosis.  No free air or free fluid.    Aorta tapers normally with minimal atherosclerosis.    No acute osseous abnormality identified.  Degenerative changes are seen throughout the spine.  Postsurgical posterior instrumented lumbosacral fusion changes with laminectomies are seen extending from the L3 through S1 levels.  Spinal stimulator device is seen within the subcutaneous soft tissues of the left lower back with leads extending into the visualized lower thoracic spine.    Moderate size fat containing periumbilical hernia is seen with multilobular contour.                                       Medications   iohexoL (OMNIPAQUE 350) injection 75 mL (75 mLs Intravenous Given 3/21/25 1907)     Medical Decision Making  Patient is a 74-year-old male with a history of a chronic abdominal hernia.  He states today around 14:00 he was working in the [employer] when he fell due to stubbing his toe in some grass and fell onto his abdomen.  He reports that the hernia is now out and  "very painful.  He denies diarrhea nausea vomiting constipation he has taken no medications for this problem.  He has never had this issue before.    On physical examination there is a large periumbilical ventral hernia.  It reduced easily to palpation.  There is no color change the abdomen.    Differential diagnosis includes ventral hernia umbilical hernia indirect inguinal hernia incarcerated hernia    Problems Addressed:  Ventral hernia without obstruction or gangrene: acute illness or injury     Details: Patient cautioned strongly to return if hernia is no longer reducible.  He should get a hernia belt and use it.  Follow-up with general surgery.  Norco for pain.    Amount and/or Complexity of Data Reviewed  Labs: ordered. Decision-making details documented in ED Course.  Radiology: ordered. Decision-making details documented in ED Course.  Discussion of management or test interpretation with external provider(s): Vital signs at the time of disposition were:  /78   Pulse 90   Temp 98.8 °F (37.1 °C) (Oral)   Resp 20   Ht 5' 9" (1.753 m)   Wt 105.2 kg (232 lb)   SpO2 99%   BMI 34.26 kg/m²       See AVS for additional recommendations. Medications listed herein were prescribed after reviewing the patient's allergies, medication list, history, most recent laboratories as available.  Referrals below were provided after reviewing the patient's previous medical providers. He understands he  should return for any worsening or changes in condition.  Prior to discharge the patient was asked if he  had any additional concerns or complaints and he declined. The patient was given an opportunity to ask questions and all were answered to his satisfaction.     Risk  Prescription drug management.  Diagnosis or treatment significantly limited by social determinants of health.               ED Course as of 03/21/25 2123   Fri Mar 21, 2025   1651 BP: 128/69 [VC]   1651 Temp: 99.1 °F (37.3 °C) [VC]   1651 Temp Source: " Oral [VC]   1651 Pulse: 108 [VC]   1651 Resp: 20 [VC]   1651 SpO2: 96 % [VC]   1755 CBC auto differential(!)  Normal cbc. [VC]   1755 Urinalysis, Reflex to Urine Culture Urine, Clean Catch  Normal urinalysis. [VC]   1801 Lactic Acid Level: 1.3 [VC]   1812 Comprehensive metabolic panel(!)  Mild elevation of ast.  Normal cmp. [VC]   1937 CT Abdomen Pelvis With IV Contrast NO Oral Contrast  1. No acute intra-abdominal abnormalities identified.  No evidence of bowel obstruction, as clinically questioned.  2. Sigmoid diverticulosis with no evidence of acute diverticulitis.  3. Bilateral renal cysts with exophytic left renal hypodense lesion which measures higher than simple fluid density.  Consider future nonemergent outpatient ultrasound follow-up/surveillance to confirm and assess for cystic nature of this lesion.   [VC]   1937 CT Abdomen Pelvis With IV Contrast NO Oral Contrast  Moderate size fat containing periumbilical hernia is seen with multilobular contour.      [VC]      ED Course User Index  [VC] Michael Austin DNP                           Clinical Impression:  Final diagnoses:  [K43.9] Ventral hernia without obstruction or gangrene (Primary)          ED Disposition Condition    Discharge Stable          ED Prescriptions       Medication Sig Dispense Start Date End Date Auth. Provider    HYDROcodone-acetaminophen (NORCO) 5-325 mg per tablet Take 1 tablet by mouth every 6 (six) hours as needed for Pain. 12 tablet 3/21/2025 -- Michael Austin DNP          Follow-up Information       Follow up With Specialties Details Why Contact Nehemiah Quiroga, DO Family Medicine Schedule an appointment as soon as possible for a visit   [address] [place] [address]  [phone]                 [1]   Social History  Tobacco Use    Smoking status: Never    Smokeless tobacco: Never   Substance Use Topics    Alcohol use: No    Drug use: No        Michael Austin DNP  03/21/25 6111

## 2025-03-23 NOTE — PROGRESS NOTES
Outpatient Rehab    Physical Therapy Visit    Patient Name: Feroz Richmond  MRN: [id number]  YOB: 1950  Encounter Date: 3/18/2025    Therapy Diagnosis:   Encounter Diagnosis   Name Primary?    Impaired functional mobility, balance, gait, and endurance Yes     Physician: Aiyana Sharp, *    Physician Orders: Eval and Treat  Medical Diagnosis: R29.6 (ICD-10-CM) - Frequent falls     Visit # / Visits Authorized:  2 / 20  Date of Evaluation:  2/21/2025   Insurance Authorization Period: 2/21/2025 to 5/10/2025  Plan of Care Certification:  2/21/2025 to 5/1/2025      PT/PTA: PT   Time In: 0845   Time Out: 0930  Total Time: 45   Total Billable Time:  40 minutes       Precautions     Fall; Impaired cognition; LBP; Hx of lumbar surgery (stenosis repair), Hx of scoliosis; abdominal hernia; R knee pain; Hx of skin cancer (multiple sites); neuropathy       Subjective   Feroz reports trying to work on posture at home since initiation of this last session.  Pain reported as 0/10.      Objective            Treatment:  Therapeutic Exercise  TE 1: Nu-step with B LEs and B UEs, Level 2.5- x 8 minutes  TE 2: transverse abdominis draw-ins, standing: re-ed form, 3-second holds- 2 x 10  Balance/Neuromuscular Re-Education  NMR 1: posture training for upright stance (out of R lateral lean), standing: with mirror- x 4 minutes  NMR 2: transverse abdominis hold with march, with L UE support to elevated rail- x 15 each  NMR 3: gait with corrected posture (per tolerance) inside // without UE support- 9 ft x 8 rounds  NMR 4: mini-squats: teaching form, 2 x 5  NMR 6: functional squat to lift [place] ball: teaching lifting mechanics, x 3    Time Entry(in minutes):  Neuromuscular Re-Education Time Entry: 17  Therapeutic Exercise Time Entry: 23    Assessment & Plan   Assessment: Mr. Richmond was challenged by isolated transverse abdominis draw-in and holds today, expressing greater difficulty with these then dynamic abdominal  holds with march. Good challenge but benefit evident from body mechanics training for functional squat and lifting taught today.  Evaluation/Treatment Tolerance: Patient tolerated treatment well    Patient will continue to benefit from skilled outpatient physical therapy to address the deficits listed in the problem list box on initial evaluation, provide pt/family education and to maximize pt's level of independence in the home and community environment.     Patient's spiritual, cultural, and educational needs considered and patient agreeable to plan of care and goals.     Education  Education was done with Patient. The patient's learning style includes Listening. The patient Verbalizes understanding.                 Plan: Continue per POC and flowsheet, with progressions as able.    Goals:   Active       Long-term Goals        Patient will achieve Timed Up and Go of 13 seconds to demonstrate improved gait stability and reduced fall-risk.       Start:  03/11/25    Expected End:  05/01/25            Patient will lift medium Dynamax weighted ball from floor to waist without LOS x 5 rounds to demonstrate improved safety with gardening.       Start:  03/11/25    Expected End:  05/01/25               Short-term Goals        Patient will achieve a negative Romberg test to demonstrate improved postural stability.       Start:  03/11/25    Expected End:  05/01/25            Patient will ambulate with upright posture, without more than minimal right lateral lean and with full stride, x 400 feet to demonstrate improved gait quality, endurance, and safety.       Start:  03/11/25    Expected End:  05/01/25                Monique Banks, PT, DPT

## 2025-03-28 ENCOUNTER — CLINICAL SUPPORT (OUTPATIENT)
Dept: REHABILITATION | Facility: HOSPITAL | Age: 75
End: 2025-03-28
Payer: MEDICARE

## 2025-03-28 DIAGNOSIS — Z74.09 IMPAIRED FUNCTIONAL MOBILITY, BALANCE, GAIT, AND ENDURANCE: Primary | ICD-10-CM

## 2025-03-28 PROCEDURE — 97110 THERAPEUTIC EXERCISES: CPT | Mod: PN

## 2025-03-28 PROCEDURE — 97112 NEUROMUSCULAR REEDUCATION: CPT | Mod: PN

## 2025-03-28 NOTE — PROGRESS NOTES
Outpatient Rehab    Physical Therapy Progress Note    Patient Name: Feroz Richmond  MRN: [id number]  YOB: 1950  Encounter Date: 3/28/2025    Therapy Diagnosis:   Encounter Diagnosis   Name Primary?    Impaired functional mobility, balance, gait, and endurance Yes     Physician: Aiyana Sharp, *    Physician Orders: Eval and Treat  Medical Diagnosis: R29.6 (ICD-10-CM) - Frequent falls     Visit # / Visits Authorized:  3 / 20  Date of Evaluation:  2/21/2025   Insurance Authorization Period: 2/21/2025 to 5/10/2025  Plan of Care Certification:  2/21/2025 to 5/1/2025      PT/PTA: PT   Time In: 0800   Time Out: 0845  Total Time: 45   Total Billable Time:  40 minutes      Precautions     Fall; Impaired cognition; LBP; Hx of lumbar surgery (stenosis repair), Hx of scoliosis; abdominal hernia; R knee pain; Hx of skin cancer (multiple sites); neuropathy       Subjective   Feroz reports having 2 more falls, one including uneven surfaces in his yard and another including picking up debris on neutral ground while managing waving by car.  Pain reported as 3/10. low back    Objective           Treatment:  Therapeutic Exercise  TE 1: Nu-step with B LEs and B UEs, Level 2.5- x 8 minutes  TE 3: scapualr muscle training: teaching isolation  TE 4: scap-squeezes, seated- 2 x 10  TE 5: R lumbar/ thoracic STM/ MFR- x 4 minutes  TE 6: HEP review and updates  Balance/Neuromuscular Re-Education  NMR 1: posture training for upright stance (out of R lateral lean), standing: with mirror- x 2 minutes  NMR 3: gait with near-neutral posture, with TCs and VC: x 200 ft, x 150 ft  NMR 5: patient education/ self-care home management for MD follow-ups  NMR 7: gait with wall on left with cues for wall external cue for L shoulder touch- 2 x 12 ft    Time Entry(in minutes):  Neuromuscular Re-Education Time Entry: 17  Therapeutic Exercise Time Entry: 23    Assessment & Plan   Assessment: Mr. Richmond exhibited need for re-ed and further  guidance for postural changes and attention, as he ambulated into clinic with significant R lateral lean and did so again at gait-initiation in early part of session. Mirror feedback and gait practice with cueing was helpful, atleast with motor performance improvement demonstrated today. Wall external feedback, with shoulder to wall on L side, also had favorable response. Patient responded very well to scapular muscle training, and reported favorable subjective response as well.  Evaluation/Treatment Tolerance: Patient tolerated treatment well    Patient will continue to benefit from skilled outpatient physical therapy to address the deficits listed in the problem list box on initial evaluation, provide pt/family education and to maximize pt's level of independence in the home and community environment.     Patient's spiritual, cultural, and educational needs considered and patient agreeable to plan of care and goals.     Education  Education was done with Patient. The patient's learning style includes Listening and Demonstration. The patient Requires assistance.         HEP including scapular retraction and gait/standing practice with postural changes (core work currently on hold until clearance requested by therapist from MD treating hernia)       Plan: Continue per POC and flowsheet, with progressions as able.    Goals:   Active       Long-term Goals        Patient will achieve Timed Up and Go of 13 seconds to demonstrate improved gait stability and reduced fall-risk.       Start:  03/11/25    Expected End:  05/01/25            Patient will lift medium Dynamax weighted ball from floor to waist without LOS x 5 rounds to demonstrate improved safety with gardening.       Start:  03/11/25    Expected End:  05/01/25               Short-term Goals        Patient will achieve a negative Romberg test to demonstrate improved postural stability.       Start:  03/11/25    Expected End:  05/01/25            Patient will  ambulate with upright posture, without more than minimal right lateral lean and with full stride, x 400 feet to demonstrate improved gait quality, endurance, and safety. (Progressing)       Start:  03/11/25    Expected End:  05/01/25                Monique Banks PT, DPT

## 2025-04-03 PROBLEM — E66.01 CLASS 2 SEVERE OBESITY DUE TO EXCESS CALORIES WITH SERIOUS COMORBIDITY AND BODY MASS INDEX (BMI) OF 35.0 TO 35.9 IN ADULT: Status: ACTIVE | Noted: 2025-04-03

## 2025-04-03 PROBLEM — E66.812 CLASS 2 SEVERE OBESITY DUE TO EXCESS CALORIES WITH SERIOUS COMORBIDITY AND BODY MASS INDEX (BMI) OF 35.0 TO 35.9 IN ADULT: Status: ACTIVE | Noted: 2025-04-03

## 2025-04-08 ENCOUNTER — CLINICAL SUPPORT (OUTPATIENT)
Dept: REHABILITATION | Facility: HOSPITAL | Age: 75
End: 2025-04-08
Payer: MEDICARE

## 2025-04-08 DIAGNOSIS — Z74.09 IMPAIRED FUNCTIONAL MOBILITY, BALANCE, GAIT, AND ENDURANCE: Primary | ICD-10-CM

## 2025-04-08 PROCEDURE — 97112 NEUROMUSCULAR REEDUCATION: CPT | Mod: PN

## 2025-04-08 PROCEDURE — 97110 THERAPEUTIC EXERCISES: CPT | Mod: PN

## 2025-04-09 NOTE — PROGRESS NOTES
Outpatient Rehab    Physical Therapy Visit    Patient Name: Feroz Richmond  MRN: [id number]  YOB: 1950  Encounter Date: 4/8/2025    Therapy Diagnosis:   Encounter Diagnosis   Name Primary?    Impaired functional mobility, balance, gait, and endurance Yes     Physician: Aiyana Sharp, *    Physician Orders: Eval and Treat  Medical Diagnosis: R29.6 (ICD-10-CM) - Frequent falls     Visit # / Visits Authorized:  4 / 20  Date of Evaluation:  2/21/2025   Insurance Authorization Period: 2/21/2025 to 5/10/2025  Plan of Care Certification:  2/21/2025 to 5/1/2025      Time In: 1115   Time Out: 1200  Total Time: 45   Total Billable Time: 40        Precautions     Fall; Impaired cognition; LBP; Hx of lumbar surgery (stenosis repair), Hx of scoliosis; abdominal hernia; R knee pain; Hx of skin cancer (multiple sites); neuropathy       Subjective   Feroz reports that his MD for hernia reported clearance for PT; but patient did not specifically inquire about core strengthening. Therapist to call to inquire.  Pain reported as 3/10. low back    Objective            Treatment:  Therapeutic Exercise  TE 1: Nu-step with B LEs and B UEs, Level 2.5- x 8 minutes  TE 6: home exercise/ activity review and updates  TE 7: L lateral flexion stretch over bolster- 5 x 25 seconds  TE 8: mini-squats with core hold, with 1 UE support and mirror feedback for posture- 2 x 8  Balance/Neuromuscular Re-Education  NMR 1: posture training for upright stance (out of R lateral lean), standing: with mirror- x 3 minutes, without mirror- x 2 minutes  NMR 3: gait with near-neutral posture, with TCs and VC: x 90 ft, x 400 ft, x 150 ft  NMR 4: side-stepping with green thera-band, without UE support- 9 ft x 8 rounds    Time Entry(in minutes):  Neuromuscular Re-Education Time Entry: 15  Therapeutic Exercise Time Entry: 25    Assessment & Plan   Assessment: Mr. Richmond responded well to side-stepping with thera-band resistance. Though cues were  needed for slight technique correction, patient did well with form and stability with this activity. Favorable response to lateral flexion stretch over bolster also initiated today, and planned to continue in clinic and at home.  Evaluation/Treatment Tolerance: Patient tolerated treatment well    Patient will continue to benefit from skilled outpatient physical therapy to address the deficits listed in the problem list box on initial evaluation, provide pt/family education and to maximize pt's level of independence in the home and community environment.     Patient's spiritual, cultural, and educational needs considered and patient agreeable to plan of care and goals.     Education  Education was done with Patient. The patient's learning style includes Listening and Demonstration. The patient Requires assistance.                 Plan: Continue per POC and flowsheet, with progressions as able.    Goals:   Active       Long-term Goals        Patient will achieve Timed Up and Go of 13 seconds to demonstrate improved gait stability and reduced fall-risk.       Start:  03/11/25    Expected End:  05/01/25            Patient will lift medium Dynamax weighted ball from floor to waist without LOS x 5 rounds to demonstrate improved safety with gardening.       Start:  03/11/25    Expected End:  05/01/25               Short-term Goals        Patient will achieve a negative Romberg test to demonstrate improved postural stability.       Start:  03/11/25    Expected End:  05/01/25            Patient will ambulate with upright posture, without more than minimal right lateral lean and with full stride, x 400 feet to demonstrate improved gait quality, endurance, and safety. (Progressing)       Start:  03/11/25    Expected End:  05/01/25                Monique Banks, PT, DPT

## 2025-04-17 DIAGNOSIS — M51.360 DEGENERATION OF INTERVERTEBRAL DISC OF LUMBAR REGION WITH DISCOGENIC BACK PAIN: Primary | ICD-10-CM

## 2025-04-22 ENCOUNTER — DOCUMENTATION ONLY (OUTPATIENT)
Dept: REHABILITATION | Facility: HOSPITAL | Age: 75
End: 2025-04-22
Payer: MEDICARE

## 2025-04-22 NOTE — PROGRESS NOTES
[facility]                                 No Shows Therapy Appointment     Trell Richmond  MRN: [id number]    Patient no shows to today's therapy appointment on 4/22/2025.    Cheryl Davalos, PTA  4/22/2025

## 2025-04-22 NOTE — PROGRESS NOTES
[facility]                                 PT/PTA conferance        PT/PTA met face to face to discuss pt's treatment plan and progress towards established goals. Pt will be seen by a physical therapist minimally every 6th visit or every 30 days.      Cheryl Davalos PTA  4/22/2025

## 2025-05-26 ENCOUNTER — OFFICE VISIT (OUTPATIENT)
Dept: CARDIOLOGY | Facility: CLINIC | Age: 75
End: 2025-05-26
Payer: MEDICARE

## 2025-05-26 VITALS
RESPIRATION RATE: 18 BRPM | OXYGEN SATURATION: 96 % | BODY MASS INDEX: 35.98 KG/M2 | DIASTOLIC BLOOD PRESSURE: 70 MMHG | WEIGHT: 242.94 LBS | HEART RATE: 80 BPM | SYSTOLIC BLOOD PRESSURE: 100 MMHG | HEIGHT: 69 IN

## 2025-05-26 DIAGNOSIS — I25.10 CORONARY ARTERY DISEASE, UNSPECIFIED VESSEL OR LESION TYPE, UNSPECIFIED WHETHER ANGINA PRESENT, UNSPECIFIED WHETHER NATIVE OR TRANSPLANTED HEART: ICD-10-CM

## 2025-05-26 DIAGNOSIS — I25.10 CORONARY ARTERY CALCIFICATION: ICD-10-CM

## 2025-05-26 DIAGNOSIS — R42 DIZZINESS: ICD-10-CM

## 2025-05-26 DIAGNOSIS — E78.5 DYSLIPIDEMIA: ICD-10-CM

## 2025-05-26 DIAGNOSIS — I10 HYPERTENSION, UNSPECIFIED TYPE: Primary | ICD-10-CM

## 2025-05-26 PROCEDURE — 1100F PTFALLS ASSESS-DOCD GE2>/YR: CPT | Mod: CPTII,S$GLB,, | Performed by: INTERNAL MEDICINE

## 2025-05-26 PROCEDURE — 99999 PR PBB SHADOW E&M-EST. PATIENT-LVL IV: CPT | Mod: PBBFAC,,, | Performed by: INTERNAL MEDICINE

## 2025-05-26 PROCEDURE — 3008F BODY MASS INDEX DOCD: CPT | Mod: CPTII,S$GLB,, | Performed by: INTERNAL MEDICINE

## 2025-05-26 PROCEDURE — 99214 OFFICE O/P EST MOD 30 MIN: CPT | Mod: S$GLB,,, | Performed by: INTERNAL MEDICINE

## 2025-05-26 PROCEDURE — 3074F SYST BP LT 130 MM HG: CPT | Mod: CPTII,S$GLB,, | Performed by: INTERNAL MEDICINE

## 2025-05-26 PROCEDURE — 1126F AMNT PAIN NOTED NONE PRSNT: CPT | Mod: CPTII,S$GLB,, | Performed by: INTERNAL MEDICINE

## 2025-05-26 PROCEDURE — 93000 ELECTROCARDIOGRAM COMPLETE: CPT | Mod: S$GLB,,, | Performed by: INTERNAL MEDICINE

## 2025-05-26 PROCEDURE — 3078F DIAST BP <80 MM HG: CPT | Mod: CPTII,S$GLB,, | Performed by: INTERNAL MEDICINE

## 2025-05-26 PROCEDURE — 1159F MED LIST DOCD IN RCRD: CPT | Mod: CPTII,S$GLB,, | Performed by: INTERNAL MEDICINE

## 2025-05-26 PROCEDURE — 3288F FALL RISK ASSESSMENT DOCD: CPT | Mod: CPTII,S$GLB,, | Performed by: INTERNAL MEDICINE

## 2025-05-26 PROCEDURE — 3044F HG A1C LEVEL LT 7.0%: CPT | Mod: CPTII,S$GLB,, | Performed by: INTERNAL MEDICINE

## 2025-05-26 PROCEDURE — G2211 COMPLEX E/M VISIT ADD ON: HCPCS | Mod: S$GLB,,, | Performed by: INTERNAL MEDICINE

## 2025-05-26 NOTE — PROGRESS NOTES
CARDIOVASCULAR CONSULTATION    REASON FOR CONSULT:   Trell Richmond is a 75 y.o. male who presents for   No chief complaint on file.       Ordered on: 5/5/2024   Associated Dx: Palpitations; Shortness of breath   Authorizing provider: Steve Miller MD         HISTORY OF PRESENT ILLNESS:     Patient is a pleasant 74-year-old man.  Has been referred for palpitations and shortness of breath as well as dyspnea on exertion.  Denies any resting chest pains.  Did have calcium scoring done which demonstrated elevated coronary calcium as listed.  Denies orthopnea PND    Results for orders placed or performed in visit on 09/30/24   IN OFFICE EKG 12-LEAD (to [place])    Collection Time: 09/30/24  9:59 AM   Result Value Ref Range    QRS Duration 102 ms    OHS QTC Calculation 439 ms    Narrative    Test Reason : I25.10,I25.84,    Vent. Rate : 082 BPM     Atrial Rate : 082 BPM     P-R Int : 218 ms          QRS Dur : 102 ms      QT Int : 376 ms       P-R-T Axes : 051 -11 006 degrees     QTc Int : 439 ms    Sinus rhythm with 1st degree A-V block  Minimal voltage criteria for LVH, may be normal variant  Septal infarct (cited on or before 05-MAY-2024)  Abnormal ECG  When compared with ECG of 05-MAY-2024 11:46,  Questionable change in initial forces of Septal leads  Confirmed by Donal Amezcua MD ([id number]) on 10/1/2024 6:55:23 PM    Referred By:  MEAGAN           Confirmed By:Donal Amezcua MD       SCORE SUMMARY     Your total calcium score is 153.6.     Incidental findings: Included portions of the chest demonstrate no pericardial effusion.  The thoracic aorta appears not aneurysmal.  There are calcified granulomatous lymph nodes identified within the mediastinum and right hilum.  There is calcified granuloma within the right middle lobe.  No consolidation, pleural effusion, or pneumothorax identified of the included portions of the lungs.  No acute displaced fracture identified.     Impression:     Your total calcium score is  153.6.  Moderate to high coronary heart disease risk.        Electronically signed by:Avelino Barrios MD  Date:                                            03/21/2024  Time:                                           12:22      Notes from September 24: Patient here for follow-up.  Stress test was rejected by his insurance.  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  No substernal chest pressure or pain.    Results for orders placed during the hospital encounter of 09/27/24    Echo    Interpretation Summary    Left Ventricle: The left ventricle is normal in size. There is mild concentric hypertrophy. There is normal systolic function with a visually estimated ejection fraction of 60 - 65%. Grade I diastolic dysfunction.    Right Ventricle: Normal right ventricular cavity size. Systolic function is normal.    Left Atrium: Left atrium is mildly dilated.    Aorta: Aortic root is mildly dilated measuring 3.92 cm.    Pulmonary Artery: The estimated pulmonary artery systolic pressure is 22 mmHg.    IVC/SVC: Normal venous pressure at 3 mmHg.    November 24:    Results for orders placed or performed in visit on 09/30/24   IN OFFICE EKG 12-LEAD (to [place])    Collection Time: 09/30/24  9:59 AM   Result Value Ref Range    QRS Duration 102 ms    OHS QTC Calculation 439 ms    Narrative    Test Reason : I25.10,I25.84,    Vent. Rate : 082 BPM     Atrial Rate : 082 BPM     P-R Int : 218 ms          QRS Dur : 102 ms      QT Int : 376 ms       P-R-T Axes : 051 -11 006 degrees     QTc Int : 439 ms    Sinus rhythm with 1st degree A-V block  Minimal voltage criteria for LVH, may be normal variant  Septal infarct (cited on or before 05-MAY-2024)  Abnormal ECG  When compared with ECG of 05-MAY-2024 11:46,  Questionable change in initial forces of Septal leads  Confirmed by Donal Amezcua MD ([id number]) on 10/1/2024 6:55:23 PM    Referred By:  MEAGAN           Confirmed By:Donal Amezcua MD        Results for orders placed during the hospital encounter of 09/27/24    Echo    Interpretation Summary    Left Ventricle: The left ventricle is normal in size. There is mild concentric hypertrophy. There is normal systolic function with a visually estimated ejection fraction of 60 - 65%. Grade I diastolic dysfunction.    Right Ventricle: Normal right ventricular cavity size. Systolic function is normal.    Left Atrium: Left atrium is mildly dilated.    Aorta: Aortic root is mildly dilated measuring 3.92 cm.    Pulmonary Artery: The estimated pulmonary artery systolic pressure is 22 mmHg.    IVC/SVC: Normal venous pressure at 3 mmHg.      Results for orders placed during the hospital encounter of 11/18/24    Exercise Stress - EKG    Interpretation Summary    The ECG portion of the study is negative for ischemia.    The patient reported no chest pain during the stress test.    During stress, rare PVCs are noted.    The patient exercised for 5 minutes 59 seconds on a Justin protocol, corresponding to a functional capacity of 7 METS, achieving a peak heart rate of 139 bpm, which is 95% of the age predicted maximum heart rate. The patient experienced no angina during the test.      No results found for this or any previous visit.    Notes from May 25    History of Present Illness    CHIEF COMPLAINT:  Trell presents today with dizziness.    HISTORY OF PRESENT ILLNESS:  He experiences dizziness daily, occurring while walking and sitting but not when lying down. Symptoms sometimes worsen upon standing. He requires breaks while mowing lawn due to dizziness. He has a history of coordination issues reported to primary care physician 1.5 years ago, demonstrating difficulty with finger-to-nose testing. His daily fluid intake is 32-40 oz of water, which he acknowledges as insufficient.    MEDICAL HISTORY:  He has peripheral neuropathy affecting his ability to walk. He has a hernia pending surgical consultation.            PAST MEDICAL HISTORY:     Past Medical History:   Diagnosis Date    History of Hashimoto thyroiditis     History of nephrolithiasis     Iron deficiency anemia 02/22/2023    Moderate hypertension 02/22/2023    Paroxysmal atrial fibrillation     Peripheral neuropathy, idiopathic 08/11/2021    Sleep apnea        PAST SURGICAL HISTORY:     Past Surgical History:   Procedure Laterality Date    ablasion Left     venous- leg    BACK SURGERY  11/2020    COLONOSCOPY  02/12/2019    Done by Dr. DAVID Romero    DENTAL SURGERY      EPIDURAL STEROID INJECTION  03/2018, 09/2018    pat reported    FRACTURE SURGERY  1964    Broken arm    head surgery      for subdural hematoma    HERNIA REPAIR  5-2000    Abdominal    MYELOGRAPHY N/A 10/07/2020    Procedure: MYELOGRAM;  Surgeon: Cinda Diagnostic Provider;  Location: [facility] OR;  Service: Interventional Radiology;  Laterality: N/A;    PROSTATE SURGERY      RHIZOTOMY  03/2019    pat reported    sinus bone spur      SPINE SURGERY  11/02/2020    Spinal stenosis    TONSILLECTOMY      UMBILICAL HERNIA REPAIR  1999    @ [facility]           SOCIAL HISTORY:     Social History     Socioeconomic History    Marital status: Single   Tobacco Use    Smoking status: Never    Smokeless tobacco: Never   Substance and Sexual Activity    Alcohol use: No    Drug use: No    Sexual activity: Yes     Partners: Female     Birth control/protection: Condom     Social Drivers of Health     Financial Resource Strain: Low Risk  (9/4/2024)    Overall Financial Resource Strain (CARDIA)     Difficulty of Paying Living Expenses: Not hard at all   Food Insecurity: No Food Insecurity (9/4/2024)    Hunger Vital Sign     Worried About Running Out of Food in the Last Year: Never true     Ran Out of Food in the Last Year: Never true   Physical Activity: Insufficiently Active (9/4/2024)    Exercise Vital Sign     Days of Exercise per Week: 1 day     Minutes of Exercise per Session: 40 min   Stress: Stress Concern Present  "(9/4/2024)    [employer] [place] [employer] - Occupational Stress Questionnaire     Feeling of Stress : To some extent   Housing Stability: Unknown (9/4/2024)    Housing Stability Vital Sign     Unable to Pay for Housing in the Last Year: No       FAMILY HISTORY:     Family History   Problem Relation Name Age of Onset    Diabetes Mother Christine Richmond     Cancer Sister      Alcohol abuse Father Renzo Richmond     Cancer Father Renzo Richmond     Heart disease Father Renzo Richmond         Myocardial infarction    Alcohol abuse Sister Catherine Richmond     Cancer Brother Ji Richmond        REVIEW OF SYSTEMS:   Review of Systems   Constitutional: Negative.   HENT: Negative.     Eyes: Negative.    Respiratory: Negative.     Endocrine: Negative.    Hematologic/Lymphatic: Negative.    Skin: Negative.    Musculoskeletal: Negative.    Gastrointestinal: Negative.    Genitourinary: Negative.    Neurological: Negative.    Psychiatric/Behavioral: Negative.     Allergic/Immunologic: Negative.        A 10 point review of systems was performed and all the pertinent positives have been mentioned. Rest of review of systems was negative.        PHYSICAL EXAM:     Vitals:    05/26/25 1039   BP: 100/70   Pulse: 80   Resp: 18    Body mass index is 35.88 kg/m².  Weight: 110.2 kg (242 lb 15.2 oz)   Height: 5' 9" (175.3 cm)     Physical Exam  Vitals reviewed.   Constitutional:       Appearance: He is well-developed.   HENT:      Head: Normocephalic.   Eyes:      Conjunctiva/sclera: Conjunctivae normal.      Pupils: Pupils are equal, round, and reactive to light.   Cardiovascular:      Rate and Rhythm: Normal rate and regular rhythm.      Heart sounds: Normal heart sounds.   Pulmonary:      Effort: Pulmonary effort is normal.      Breath sounds: Normal breath sounds.   Abdominal:      General: Bowel sounds are normal.      Palpations: Abdomen is soft.   Musculoskeletal:      Cervical back: Normal range of motion and " neck supple.   Skin:     General: Skin is warm.   Neurological:      Mental Status: He is alert and oriented to person, place, and time.           DATA:     Laboratory:  CBC:  Recent Labs   Lab 01/10/25  0439 03/21/25  1718 03/26/25  1244   WBC 4.67 8.78 5.69   Hemoglobin 14.1 15.4 13.7 L   Hematocrit 43.6 45.0 41.6   Platelets 173 245 250       CHEMISTRIES:  Recent Labs   Lab 01/10/25  0439 03/21/25 1718 03/26/25  1244   Glucose 116 H 99 92   Sodium 139 142 141   Potassium 3.3 L 4.3 4.0   BUN 16 21 15   Creatinine 0.9 1.0 0.72   Calcium 8.5 L 9.5 9.0       CARDIAC BIOMARKERS:  Recent Labs   Lab 05/05/24  1232 03/26/25  1244   CPK  --  329 H   Troponin I <0.006  --        COAGS:        LIPIDS/LFTS:  Recent Labs   Lab 02/16/23  1245 02/26/24  1132 05/05/24  1232 01/10/25  0439 03/21/25 1718 03/26/25  1244   Cholesterol 137 152  --   --   --  149   Triglycerides 200 H 272 H  --   --   --  100   HDL 37 L 49  --   --   --  54   LDL Cholesterol 60.0 L 48.6 L  --   --   --  75.0   Non-HDL Cholesterol 100 103  --   --   --  95   AST 58 52   < > 45 H 45 H 41 H   ALT 45 38   < > 31 36 34    < > = values in this interval not displayed.       Hemoglobin A1C   Date Value Ref Range Status   03/26/2025 5.4 4.0 - 5.6 % Final     Comment:     Reference Interval:  5.0 - 5.6 Normal   5.7 - 6.4 High Risk   > 6.5 Diabetic      Hgb A1c results are standardized based on the (NGSP) National   Glycohemoglobin Standardization Program.      Hemoglobin A1C levels are related to mean serum/plasma glucose   during the preceding 2-3 months.        05/23/2018 5.8 (H) 4.8 - 5.6 % Final     Comment:              Pre-diabetes: 5.7 - 6.4           Diabetes: >6.4           Glycemic control for adults with diabetes: <7.0     05/15/2017 6.0 (H) 4.8 - 5.6 % Final     Comment:              Pre-diabetes: 5.7 - 6.4           Diabetes: >6.4           Glycemic control for adults with diabetes: <7.0         TSH  Recent Labs   Lab 05/05/24  6906  01/10/25  0439 03/26/25  1244   TSH 7.373 H 6.400 H 3.808       The 10-year ASCVD risk score (Chanda MILES, et al., 2019) is: 13.8%    Values used to calculate the score:      Age: 75 years      Sex: Male      Is Non- : No      Diabetic: No      Tobacco smoker: No      Systolic Blood Pressure: 100 mmHg      Is BP treated: No      HDL Cholesterol: 54 mg/dL      Total Cholesterol: 149 mg/dL       BNP    Lab Results   Component Value Date/Time    BNP 12 05/05/2024 12:32 PM         ASSESSMENT AND PLAN     Patient Active Problem List   Diagnosis    Hypothyroidism due to acquired atrophy of thyroid    Peripheral neuropathy, idiopathic    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    Benign prostatic hyperplasia with urinary retention    History of subdural hematoma    History of Hashimoto thyroiditis    Primary osteoarthritis involving multiple joints    Sleep apnea    History of nephrolithiasis    Encounter for long-term (current) use of medications    Iron deficiency anemia    Restless leg syndrome    Moderate hypertension    Screening PSA (prostate specific antigen)    Asymptomatic varicose veins of both lower extremities    Supraventricular tachycardia    Impaired functional mobility, balance, gait, and endurance    Class 2 severe obesity due to excess calories with serious comorbidity and body mass index (BMI) of 35.0 to 35.9 in adult         ALLERGIES AND MEDICATION:     Review of patient's allergies indicates:   Allergen Reactions    Niacin      Other reaction(s): FLUSHING AND ITCHING, FLUSHING AND ITCHING with 1500mg dose    Zanaflex [tizanidine]      dizziness    Oxycodone Rash        Medication List            Accurate as of May 26, 2025  1:01 PM. If you have any questions, ask your nurse or doctor.                CONTINUE taking these medications      atorvastatin 20 MG tablet  Commonly known as: LIPITOR  Take 1 tablet (20 mg total) by mouth once  daily.     cetirizine 10 MG tablet  Commonly known as: ZYRTEC  Take 1 tablet (10 mg total) by mouth once daily.     cyanocobalamin 1000 MCG tablet  Commonly known as: VITAMIN B-12     ezetimibe 10 mg tablet  Commonly known as: ZETIA  Take 1 tablet (10 mg total) by mouth once daily.     fenofibrate 54 MG tablet  Commonly known as: TRICOR  Take 1 tablet (54 mg total) by mouth once daily.     fluticasone propionate 50 mcg/actuation nasal spray  Commonly known as: FLONASE  1 spray (50 mcg total) by Each Nostril route 2 (two) times daily as needed for Rhinitis.     gabapentin 300 MG capsule  Commonly known as: NEURONTIN     HYDROcodone-acetaminophen 5-325 mg per tablet  Commonly known as: NORCO  Take 1 tablet by mouth every 6 (six) hours as needed for Pain.     IMITREX ORAL     iron polysaccharides 150 mg iron Cap  Commonly known as: IFEREX 150  Take 1 capsule (150 mg total) by mouth every 7 days.     levothyroxine 175 MCG tablet  Commonly known as: SYNTHROID, LEVOTHROID  Take 1 tablet (175 mcg total) by mouth once daily.     meclizine 25 mg tablet  Commonly known as: ANTIVERT  Take 1 tablet (25 mg total) by mouth 3 (three) times daily as needed for Dizziness.     OMEGA-3 FISH OIL ORAL     ondansetron 4 MG Tbdl  Commonly known as: ZOFRAN-ODT  Take 1 tablet (4 mg total) by mouth every 8 (eight) hours as needed (nausea).     pramipexole 0.5 MG tablet  Commonly known as: MIRAPEX              Orders Placed This Encounter   Procedures    Ambulatory referral/consult to ENT    Holter monitor - 48 hour    IN OFFICE EKG 12-LEAD (to [place])        Coronary calcification:  Unfortunately his nuclear stress test was canceled by his insurance.  EKG stress test did not show any significant ischemia.  Patient continued to be chest pain-free.  Continue medical management     Aggressive risk factor modification.  LDL is at goal.  States has been on fibrate and Lipitor for many years.  On ezetimibe also.  Dyslipidemia being managed by his  primary care physician.  No symptoms of myalgias or other side effects    Lab Results   Component Value Date    LDLCALC 75.0 03/26/2025       Palpitations:  Have more or less resolved.  Has not had them for many months    Dizziness:  Wear compression stockings.  Stay well hydrated.  A refer to ENT.  Check Holter monitor.     Follow-up after testing    Thank you very much for involving me in the care of your patient.  Please do not hesitate to contact me if there are any questions.      Lexie Adams MD, [facility], [facility]  Interventional Cardiologist, [facility].       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    Benign prostatic hyperplasia with urinary retention    History of subdural hematoma    History of Hashimoto thyroiditis    Primary osteoarthritis involving multiple joints    Sleep apnea    History of nephrolithiasis    Encounter for long-term (current) use of medications    Iron deficiency anemia    Restless leg syndrome    Moderate hypertension    Screening PSA (prostate specific antigen)    Asymptomatic varicose veins of both lower extremities    Supraventricular tachycardia    Impaired functional mobility, balance, gait, and endurance    Class 2 severe obesity due to excess calories with serious comorbidity and body mass index (BMI) of 35.0 to 35.9 in adult     .      This note was dictated with the help of speech recognition software.  There might be un-intended errors and/or substitutions.

## 2025-05-27 LAB
OHS QRS DURATION: 96 MS
OHS QTC CALCULATION: 440 MS

## 2025-06-24 ENCOUNTER — TELEPHONE (OUTPATIENT)
Dept: OTOLARYNGOLOGY | Facility: CLINIC | Age: 75
End: 2025-06-24
Payer: MEDICARE

## 2025-06-30 ENCOUNTER — TELEPHONE (OUTPATIENT)
Dept: OTOLARYNGOLOGY | Facility: CLINIC | Age: 75
End: 2025-06-30
Payer: MEDICARE

## 2025-07-11 ENCOUNTER — TELEPHONE (OUTPATIENT)
Dept: ORTHOPEDICS | Facility: CLINIC | Age: 75
End: 2025-07-11
Payer: MEDICARE

## 2025-07-11 DIAGNOSIS — M54.50 LOW BACK PAIN, UNSPECIFIED BACK PAIN LATERALITY, UNSPECIFIED CHRONICITY, UNSPECIFIED WHETHER SCIATICA PRESENT: Primary | ICD-10-CM

## 2025-07-16 ENCOUNTER — OFFICE VISIT (OUTPATIENT)
Dept: ORTHOPEDICS | Facility: CLINIC | Age: 75
End: 2025-07-16
Payer: MEDICARE

## 2025-07-16 ENCOUNTER — HOSPITAL ENCOUNTER (OUTPATIENT)
Dept: RADIOLOGY | Facility: HOSPITAL | Age: 75
Discharge: HOME | End: 2025-07-16
Attending: ORTHOPAEDIC SURGERY
Payer: MEDICARE

## 2025-07-16 VITALS — BODY MASS INDEX: 32.98 KG/M2 | WEIGHT: 222.69 LBS | HEIGHT: 69 IN

## 2025-07-16 DIAGNOSIS — M51.360 DEGENERATION OF INTERVERTEBRAL DISC OF LUMBAR REGION WITH DISCOGENIC BACK PAIN: ICD-10-CM

## 2025-07-16 DIAGNOSIS — M47.812 CERVICAL SPONDYLOSIS: ICD-10-CM

## 2025-07-16 DIAGNOSIS — M41.50 DEGENERATIVE SCOLIOSIS: ICD-10-CM

## 2025-07-16 DIAGNOSIS — M50.30 DDD (DEGENERATIVE DISC DISEASE), CERVICAL: ICD-10-CM

## 2025-07-16 DIAGNOSIS — M54.50 LOW BACK PAIN, UNSPECIFIED BACK PAIN LATERALITY, UNSPECIFIED CHRONICITY, UNSPECIFIED WHETHER SCIATICA PRESENT: ICD-10-CM

## 2025-07-16 DIAGNOSIS — M81.0 OSTEOPOROSIS, UNSPECIFIED OSTEOPOROSIS TYPE, UNSPECIFIED PATHOLOGICAL FRACTURE PRESENCE: ICD-10-CM

## 2025-07-16 DIAGNOSIS — R26.89 IMBALANCE: ICD-10-CM

## 2025-07-16 DIAGNOSIS — M47.816 LUMBAR SPONDYLOSIS: Primary | ICD-10-CM

## 2025-07-16 DIAGNOSIS — M54.16 LUMBAR RADICULOPATHY: ICD-10-CM

## 2025-07-16 DIAGNOSIS — M48.02 SPINAL STENOSIS, CERVICAL REGION: ICD-10-CM

## 2025-07-16 DIAGNOSIS — M48.07 SPINAL STENOSIS, LUMBOSACRAL REGION: ICD-10-CM

## 2025-07-16 DIAGNOSIS — M54.9 DORSALGIA, UNSPECIFIED: ICD-10-CM

## 2025-07-16 DIAGNOSIS — M51.362 DEGENERATION OF INTERVERTEBRAL DISC OF LUMBAR REGION WITH DISCOGENIC BACK PAIN AND LOWER EXTREMITY PAIN: ICD-10-CM

## 2025-07-16 DIAGNOSIS — M54.12 CERVICAL RADICULOPATHY: ICD-10-CM

## 2025-07-16 PROCEDURE — 1125F AMNT PAIN NOTED PAIN PRSNT: CPT | Mod: CPTII,S$GLB,, | Performed by: ORTHOPAEDIC SURGERY

## 2025-07-16 PROCEDURE — 3008F BODY MASS INDEX DOCD: CPT | Mod: CPTII,S$GLB,, | Performed by: ORTHOPAEDIC SURGERY

## 2025-07-16 PROCEDURE — 1159F MED LIST DOCD IN RCRD: CPT | Mod: CPTII,S$GLB,, | Performed by: ORTHOPAEDIC SURGERY

## 2025-07-16 PROCEDURE — 99205 OFFICE O/P NEW HI 60 MIN: CPT | Mod: S$GLB,,, | Performed by: ORTHOPAEDIC SURGERY

## 2025-07-16 PROCEDURE — 1160F RVW MEDS BY RX/DR IN RCRD: CPT | Mod: CPTII,S$GLB,, | Performed by: ORTHOPAEDIC SURGERY

## 2025-07-16 PROCEDURE — 72110 X-RAY EXAM L-2 SPINE 4/>VWS: CPT | Mod: 26,,, | Performed by: RADIOLOGY

## 2025-07-16 PROCEDURE — 72110 X-RAY EXAM L-2 SPINE 4/>VWS: CPT | Mod: TC

## 2025-07-16 PROCEDURE — 99999 PR PBB SHADOW E&M-EST. PATIENT-LVL IV: CPT | Mod: PBBFAC,,, | Performed by: ORTHOPAEDIC SURGERY

## 2025-07-16 PROCEDURE — 3044F HG A1C LEVEL LT 7.0%: CPT | Mod: CPTII,S$GLB,, | Performed by: ORTHOPAEDIC SURGERY

## 2025-07-16 NOTE — PROGRESS NOTES
DATE: 7/17/2025  PATIENT: Trell Richmond    Attending Physician: Curly Kay M.D.    CHIEF COMPLAINT: LBP and RLE pain    HISTORY:  Trell Richmond is a 75 y.o. male w/ a hx of L3-S1 PLDF by Dr. London Ruggiero in the [place], presents for initial evaluation of low back pain and worsening loss of balance with a ambulation.  Pain his present at all times, better with sitting and worse with standing up. Pain radiates down RLE to the foot. He feels like he leans to the right side when it walks which he is in pain relief. He cannot walk longer than 1 block due to pain; he gets relief by leaning forward on a shopping cart. There is no associated numbness and tingling. There is no subjective weakness. Prior treatments have included steroid injection, nerve block, physical therapy.  He endorsed chronic bilateral lower extremity neuropathy from mid tibia all the way to the toes.  Use referred to our clinic by Dr. Ruggiero for evaluation of his dextro-scoliosis and low back pain.    He also has neck pain that radiates down RUE to the fingers. He has n/t and weakness in RUE.     The Patient endorses myelopathic symptoms such as handwriting changes or difficulty with buttons/coins/keys. He has trouble with walking/balance. Denies perineal paresthesias, bowel/bladder dysfunction.    The patient does not smoke, have DM or endorse IVDU. The patient is not on any blood thinners and does not take chronic narcotics. He is a retired .    PAST MEDICAL/SURGICAL HISTORY:  Past Medical History:   Diagnosis Date    History of Hashimoto thyroiditis     History of nephrolithiasis     Iron deficiency anemia 02/22/2023    Moderate hypertension 02/22/2023    Paroxysmal atrial fibrillation     Peripheral neuropathy, idiopathic 08/11/2021    Sleep apnea      Past Surgical History:   Procedure Laterality Date    ablasion Left     venous- leg    BACK SURGERY  11/2020    COLONOSCOPY  02/12/2019    Done by Dr. DAVID Romero    DENTAL  "SURGERY      EPIDURAL STEROID INJECTION  03/2018, 09/2018    pat reported    FRACTURE SURGERY  1964    Broken arm    head surgery      for subdural hematoma    HERNIA REPAIR  5-2000    Abdominal    MYELOGRAPHY N/A 10/07/2020    Procedure: MYELOGRAM;  Surgeon: Cinda Diagnostic Provider;  Location: [facility];  Service: Interventional Radiology;  Laterality: N/A;    PROSTATE SURGERY      RHIZOTOMY  03/2019    pat reported    sinus bone spur      SPINE SURGERY  11/02/2020    Spinal stenosis    TONSILLECTOMY      UMBILICAL HERNIA REPAIR  1999    @ [facility]       Current Medications: Current Medications[1]    Social History: Social History[2]    REVIEW OF SYSTEMS:  Constitution: Negative. Negative for chills, fever and night sweats.   Cardiovascular: Negative for chest pain and syncope.   Respiratory: Negative for cough and shortness of breath.   Gastrointestinal: See HPI. Negative for nausea/vomiting. Negative for abdominal pain.  Genitourinary: See HPI. Negative for discoloration or dysuria.  Hematologic/Lymphatic: negative for bleeding/clotting disorders.   Musculoskeletal: Negative for falls and muscle weakness.   Neurological: See HPI. no history of seizures. no history of cranial surgery or shunts.  Neurological: See HPI. No seizures.   Endocrine: Negative for polydipsia, polyphagia and polyuria.   Allergic/Immunologic: Negative for hives and persistent infections.     EXAM:  Ht 5' 9" (1.753 m)   Wt 101 kg (222 lb 10.6 oz)   BMI 32.88 kg/m²     PHYSICAL EXAMINATION:    General: The patient is a  75 y.o. male in no apparent distress, the patient is orientatied to person, place and time.  Psych: Normal mood and affect  HEENT: Vision grossly intact, hearing intact to the spoken word.  Lungs: Respirations unlabored.  Gait: Normal station and gait, no difficulty with toe or heel walk.   Skin: Dorsal lumbar skin negative for rashes, lesions, hairy patches and surgical scars. There is no lumbar tenderness to palpation.  Range " "of motion: Lumbar range of motion is acceptable.  Spinal Balance: R sided lean  Musculoskeletal: No pain with the range of motion of the bilateral hips. No trochanteric tenderness to palpation.  Vascular: Bilateral lower extremities warm and well perfused, Dorsalis pedis pulses 2+ bilaterally.  Neurological: Normal strength and tone in all major motor groups in the bilateral lower extremities. Normal sensation to light touch in the L2-S1 dermatomes bilaterally.  Deep tendon reflexes symmetric  in the bilateral lower extremities.  Negative Babinski bilaterally. Straight leg raise negative bilaterally.    IMAGING:   Today I independently reviewed the following images and my interpretations are as follows:    AP, Lat and Flex/Ex  upright L-spine demonstrate spondylosis and DDD. L3-S1 PLDF. Degen scoliosis. Spine leans toward R side.    CT cervical showed spondylosis.    Body mass index is 32.88 kg/m².  Hemoglobin A1C   Date Value Ref Range Status   03/26/2025 5.4 4.0 - 5.6 % Final     Comment:     Reference Interval:  5.0 - 5.6 Normal   5.7 - 6.4 High Risk   > 6.5 Diabetic      Hgb A1c results are standardized based on the (NGSP) National   Glycohemoglobin Standardization Program.      Hemoglobin A1C levels are related to mean serum/plasma glucose   during the preceding 2-3 months.        05/23/2018 5.8 (H) 4.8 - 5.6 % Final     Comment:              Pre-diabetes: 5.7 - 6.4           Diabetes: >6.4           Glycemic control for adults with diabetes: <7.0     05/15/2017 6.0 (H) 4.8 - 5.6 % Final     Comment:              Pre-diabetes: 5.7 - 6.4           Diabetes: >6.4           Glycemic control for adults with diabetes: <7.0         ASSESSMENT/PLAN:    Trell Deras" was seen today for pain.    Diagnoses and all orders for this visit:    Lumbar spondylosis    Degeneration of intervertebral disc of lumbar region with discogenic back pain  -     Ambulatory referral/consult to Orthopedics    Degenerative scoliosis  -     " X-Ray Scoliosis Complete; Future    Lumbar radiculopathy    Degeneration of intervertebral disc of lumbar region with discogenic back pain and lower extremity pain    Spinal stenosis, lumbosacral region  -     CT Lumbar Spine Without Contrast; Future    Dorsalgia, unspecified  -     MRI Lumbar Spine Without Contrast; Future    Spinal stenosis, cervical region  -     MRI Cervical Spine Without Contrast; Future    Imbalance  -     MRI Thoracic Spine Without Contrast; Future    Cervical spondylosis  -     X-Ray Cervical Spine AP Lat with Flex Ex; Future    Cervical radiculopathy  -     X-Ray Cervical Spine AP Lat with Flex Ex; Future    DDD (degenerative disc disease), cervical    Osteoporosis, unspecified osteoporosis type, unspecified pathological fracture presence  -     DXA Bone Density Axial Skeleton 1 or more sites; Future      Follow up in about 6 weeks (around 8/27/2025).    Patient has lumbar degenerative spondylolisthesis and RLE radiculopathy, in the setting of prior L3-S1 PLDF. He also has cervical spondylosis and RUE radiculopathy. I discussed the natural history of their diagnoses as well as surgical and nonsurgical treatment options. I educated the patient on the importance of core/back strengthening, correct posture, bending/lifting ergonomics, and low-impact aerobic exercises (walking, elliptical, and aquatherapy). Continue meds. Given his myelopathic symptoms, I ordered entire spine MRI to evaluate stenosis. I ordered scoliosis Xrs to assess his global balance. I ordered CT lumbar to evaluate fusion. I ordered DEXA to assess his bone quality. He will FU in 6 weeks to review images.    I have personally examined the patient and agree with the above plan.    Curly Kay MD  Orthopaedic Spine Surgeon  Department of Orthopaedic Surgery  [phone]          [1]   Current Outpatient Medications:     atorvastatin (LIPITOR) 20 MG tablet, Take 1 tablet (20 mg total) by mouth once daily., Disp: 90 tablet,  Rfl: 4    carBAMazepine (TEGRETOL) 200 mg tablet, Take 200 mg by mouth 2 (two) times a day., Disp: , Rfl:     cyanocobalamin (VITAMIN B-12) 1000 MCG tablet, Take 2,000 mcg by mouth once daily., Disp: , Rfl:     ezetimibe (ZETIA) 10 mg tablet, Take 1 tablet (10 mg total) by mouth once daily., Disp: 90 tablet, Rfl: 4    fenofibrate (TRICOR) 54 MG tablet, Take 1 tablet (54 mg total) by mouth once daily., Disp: 90 tablet, Rfl: 4    fluticasone propionate (FLONASE) 50 mcg/actuation nasal spray, 1 spray (50 mcg total) by Each Nostril route 2 (two) times daily as needed for Rhinitis., Disp: 15 g, Rfl: 0    gabapentin (NEURONTIN) 300 MG capsule, Take 300 mg by mouth 3 (three) times daily., Disp: , Rfl:     HYDROcodone-acetaminophen (NORCO) 5-325 mg per tablet, Take 1 tablet by mouth every 6 (six) hours as needed for Pain., Disp: 12 tablet, Rfl: 0    iron polysaccharides (IFEREX 150) 150 mg iron Cap, Take 1 capsule (150 mg total) by mouth every 7 days., Disp: 12 capsule, Rfl: 4    levothyroxine (SYNTHROID, LEVOTHROID) 175 MCG tablet, Take 1 tablet (175 mcg total) by mouth once daily., Disp: 90 tablet, Rfl: 4    meclizine (ANTIVERT) 25 mg tablet, Take 1 tablet (25 mg total) by mouth 3 (three) times daily as needed for Dizziness., Disp: 20 tablet, Rfl: 0    omega-3/dha/epa/fish oil (OMEGA-3 FISH OIL ORAL), Take 1 capsule by mouth once daily., Disp: , Rfl:     ondansetron (ZOFRAN-ODT) 4 MG TbDL, Take 1 tablet (4 mg total) by mouth every 8 (eight) hours as needed (nausea)., Disp: 10 tablet, Rfl: 0    pramipexole (MIRAPEX) 0.5 MG tablet, Take 1 mg by mouth 2 (two) times daily., Disp: , Rfl:     sumatriptan succinate (IMITREX ORAL), Take 1 tablet by mouth daily as needed. , Disp: , Rfl:     cetirizine (ZYRTEC) 10 MG tablet, Take 1 tablet (10 mg total) by mouth once daily., Disp: 30 tablet, Rfl: 0  [2]   Social History  Socioeconomic History    Marital status: Single   Tobacco Use    Smoking status: Never    Smokeless tobacco:  Never   Substance and Sexual Activity    Alcohol use: No    Drug use: No    Sexual activity: Yes     Partners: Female     Birth control/protection: Condom     Social Drivers of Health     Financial Resource Strain: Low Risk  (9/4/2024)    Overall Financial Resource Strain (CARDIA)     Difficulty of Paying Living Expenses: Not hard at all   Food Insecurity: No Food Insecurity (9/4/2024)    Hunger Vital Sign     Worried About Running Out of Food in the Last Year: Never true     Ran Out of Food in the Last Year: Never true   Physical Activity: Insufficiently Active (9/4/2024)    Exercise Vital Sign     Days of Exercise per Week: 1 day     Minutes of Exercise per Session: 40 min   Stress: Stress Concern Present (9/4/2024)    [employer] - Occupational Stress Questionnaire     Feeling of Stress : To some extent   Housing Stability: Unknown (9/4/2024)    Housing Stability Vital Sign     Unable to Pay for Housing in the Last Year: No

## 2025-07-23 ENCOUNTER — CLINICAL SUPPORT (OUTPATIENT)
Dept: AUDIOLOGY | Facility: CLINIC | Age: 75
End: 2025-07-23
Payer: MEDICARE

## 2025-07-23 ENCOUNTER — OFFICE VISIT (OUTPATIENT)
Dept: OTOLARYNGOLOGY | Facility: CLINIC | Age: 75
End: 2025-07-23
Payer: MEDICARE

## 2025-07-23 VITALS
DIASTOLIC BLOOD PRESSURE: 85 MMHG | SYSTOLIC BLOOD PRESSURE: 123 MMHG | HEIGHT: 69 IN | BODY MASS INDEX: 32.75 KG/M2 | WEIGHT: 221.13 LBS

## 2025-07-23 DIAGNOSIS — R42 DIZZINESS: ICD-10-CM

## 2025-07-23 DIAGNOSIS — R42 DIZZINESS AND GIDDINESS: Primary | ICD-10-CM

## 2025-07-23 PROCEDURE — 1126F AMNT PAIN NOTED NONE PRSNT: CPT | Mod: CPTII,S$GLB,, | Performed by: NURSE PRACTITIONER

## 2025-07-23 PROCEDURE — 3288F FALL RISK ASSESSMENT DOCD: CPT | Mod: CPTII,S$GLB,, | Performed by: NURSE PRACTITIONER

## 2025-07-23 PROCEDURE — 99204 OFFICE O/P NEW MOD 45 MIN: CPT | Mod: S$GLB,,, | Performed by: NURSE PRACTITIONER

## 2025-07-23 PROCEDURE — 3074F SYST BP LT 130 MM HG: CPT | Mod: CPTII,S$GLB,, | Performed by: NURSE PRACTITIONER

## 2025-07-23 PROCEDURE — 3008F BODY MASS INDEX DOCD: CPT | Mod: CPTII,S$GLB,, | Performed by: NURSE PRACTITIONER

## 2025-07-23 PROCEDURE — 1159F MED LIST DOCD IN RCRD: CPT | Mod: CPTII,S$GLB,, | Performed by: NURSE PRACTITIONER

## 2025-07-23 PROCEDURE — 1100F PTFALLS ASSESS-DOCD GE2>/YR: CPT | Mod: CPTII,S$GLB,, | Performed by: NURSE PRACTITIONER

## 2025-07-23 PROCEDURE — 3079F DIAST BP 80-89 MM HG: CPT | Mod: CPTII,S$GLB,, | Performed by: NURSE PRACTITIONER

## 2025-07-23 PROCEDURE — 3044F HG A1C LEVEL LT 7.0%: CPT | Mod: CPTII,S$GLB,, | Performed by: NURSE PRACTITIONER

## 2025-07-23 NOTE — PROGRESS NOTES
Please click on link to view Audiogram:  Document on 7/23/2025 8:26 AM by Karol Araujo AU.D: Audiogram    Mr. Trell Richmond, a 75 y.o. male, was seen in the clinic today for an audiological evaluation. Patient's main complaint was dizziness.    Otoscopy clear bilaterally. Tympanometry testing revealed a Type A tympanogram for the right ear and a Type A tympanogram for the left ear.     Audiological testing revealed borderline normal hearing sensitivity bilaterally. A speech reception threshold was obtained at 20 dBHL for the right ear and at 20 dBHL for the left ear. Speech discrimination was 96% for the right ear and 100% for the left ear.      Recommendations:  1. Otologic evaluation  2. Annual audiological evaluation, sooner if medically necessary or if hearing changes

## 2025-07-29 NOTE — PROGRESS NOTES
OTOLARYNGOLOGY CLINIC NOTE  Date:  07/23/2025     Chief complaint:  Chief Complaint   Patient presents with    Dizziness     Off balance, had back surgery in 2020, wakes up with lower back pain every morning, dizziness has been lasting for years now     History of Present Illness  Trell Richmond is a 75 y.o. male  presenting today for a new evaluation and treatment of dizziness.    History of Present Illness    Patient presents today for evaluation of balance issues. He reports ongoing balance issues for the past couple years with frequent episodes of imbalance while working in his yard. He is usually able to catch himself but has experienced multiple falls, particularly when his foot hits divots in the yard or while walking down hallways. He tends to walk close to walls for support. During one incident at a restaurant, he fell while attempting to help his falling girlfriend. He has been working with a therapist to address these issues. He denies complete loss of balance but reports significant impairment in maintaining steady movement during daily activities. He tends to lean and favor certain positions while walking to minimize discomfort. He has peripheral neuropathy in lower legs with decreased sensation and scoliosis affecting gait and posture. He has a history of hernia from a fall while mowing lawn requiring emergency room visit, and resolved migraine headaches. He has chronic arm pain currently managed with ineffective steroid injections at [facility]. He was evaluated by otolaryngologist Dr. Syed several years ago with good hearing noted at that time. He previously attended physical therapy but discontinued due to therapist's strict approach. He is currently receiving steroid injections from pain specialist at [facility] for arm pain without benefit. He reports insufficient sleep duration and expresses concern about its potential impact on his overall health and neurological function.        Past Medical  History  Past Medical History:   Diagnosis Date    History of Hashimoto thyroiditis     History of nephrolithiasis     Iron deficiency anemia 02/22/2023    Moderate hypertension 02/22/2023    Paroxysmal atrial fibrillation     Peripheral neuropathy, idiopathic 08/11/2021    Sleep apnea       Past Surgical History  Past Surgical History:   Procedure Laterality Date    ablasion Left     venous- leg    BACK SURGERY  11/2020    COLONOSCOPY  02/12/2019    Done by Dr. DAVID Romero    DENTAL SURGERY      EPIDURAL STEROID INJECTION  03/2018, 09/2018    pat reported    FRACTURE SURGERY  1964    Broken arm    head surgery      for subdural hematoma    HERNIA REPAIR  5-2000    Abdominal    MYELOGRAPHY N/A 10/07/2020    Procedure: MYELOGRAM;  Surgeon: Dosc Diagnostic Provider;  Location: [facility] OR;  Service: Interventional Radiology;  Laterality: N/A;    PROSTATE SURGERY      RHIZOTOMY  03/2019    pat reported    sinus bone spur      SPINE SURGERY  11/02/2020    Spinal stenosis    TONSILLECTOMY      UMBILICAL HERNIA REPAIR  1999    @ [facility]      Medications  Medications Ordered Prior to Encounter[1]    Review of Systems  Review of Systems   Constitutional: Negative.    HENT: Negative.     Eyes: Negative.    Respiratory: Negative.     Cardiovascular: Negative.    Gastrointestinal: Negative.    Skin: Negative.       Social History   reports that he has never smoked. He has never used smokeless tobacco. He reports that he does not drink alcohol and does not use drugs.     Family History  Family History   Problem Relation Name Age of Onset    Diabetes Mother Christine Richmond     Cancer Sister      Alcohol abuse Father Renzo Richmond     Cancer Father Renzo Richmond     Heart disease Father Renzo Richmond         Myocardial infarction    Alcohol abuse Sister Catherine Richmond     Cancer Brother Ji Richmond       Physical Exam   Vitals:    07/23/25 0829   BP: 123/85    Body mass index is 32.65 kg/m².  Weight: 100.3 kg (221 lb 1.9  "oz)   Height: 5' 9" (175.3 cm)     GENERAL: no acute distress.  HEAD: normocephalic.   EYES: lids and lashes normal. No scleral icterus  EARS: external ear without lesion, normal pinna shape and position.  External auditory canal with normal cerumen, tympanic membrane fully visible, no perforation , no retraction. No middle ear effusion.   NOSE: external nose without significant bony abnormality  ORAL CAVITY/OROPHARYNX: tongue midline and mobile. Symmetric palate rise.    NECK: trachea midline.   LYMPH NODES:No cervical lymphadenopathy.  RESPIRATORY: no stridor, no stertor. Voice normal. Respirations nonlabored.  NEURO: alert, responds to questions appropriately.   PSYCH:mood appropriate    Imaging:  The patient does not have any pertinent and/or recent imaging of the head and neck.     Labs:  CBC  Recent Labs   Lab 01/10/25  0439 03/21/25  1718 03/26/25  1244   WBC 4.67 8.78 5.69   Hemoglobin 14.1 15.4 13.7 L   Hematocrit 43.6 45.0 41.6   MCV 90 91 91   Platelets 173 245 250     BMP  Recent Labs   Lab 01/10/25  0439 03/21/25  1718 03/26/25  1244   Glucose 116 H 99 92   Sodium 139 142 141   Potassium 3.3 L 4.3 4.0   Chloride 107 108 108   CO2 18 L 22 L 27   BUN 16 21 15   Creatinine 0.9 1.0 0.72   Calcium 8.5 L 9.5 9.0       AUDIOLOGY RESULTS  Audiometric evaluation including audiogram, tympanometry, acoustic reflexes, and speech discrimination which was performed  was personally reviewed and interpreted.  Notable findings on the audiogram were normal hearing sensitivity.  Tympanometry revealed Type A tympanogram on the left and Type a tympanogram on the right. Speech discrimination was 100%  on the left, and 96% on the right.   Report of the audiologist performing this audiometric testing was also reviewed.     Assessment  1. Dizziness  - Ambulatory referral/consult to ENT  - Ambulatory Referral/Consult to Physical Therapy  - Basic vestibular evaluation; Future     Plan:  Assessment & Plan    IMPRESSION:  - Hearing " test results show minimal age-related hearing loss, with no significant gaps between left and right ear.  - Eardrum movement and brain-ear communication appear normal.  - Dizziness likely multifactorial, with possible neurological component given history of peripheral neuropathy and back issues.  - Scoliosis and tendency to lean may be contributing to balance issues.    DIZZINESS:  - Explained potential causes of dizziness, including chemical imbalance, inner ear issues, and neurological factors.  - Discussed the importance of eyes, ears, and neurological system working together for balance.  - Explained the significance of hearing test results in relation to inner ear function and brain communication.  - Discussed how lack of sleep can contribute to balance issues due to brain fatigue.  - Patient to discuss dizziness concerns with neurologist Dr. Carvajal at next appointment.  - Ordered [facility] test at [facility].  - Ordered vestibular therapy (balance therapy).        Discussed plan of care with patient in detail and all questions answered. Patient reported understanding of plan of care.       This note was generated with the assistance of ambient listening technology. Verbal consent was obtained by the patient and accompanying visitor(s) for the recording of patient appointment to facilitate this note. I attest to having reviewed and edited the generated note for accuracy, though some syntax or spelling errors may persist. Please contact the author of this note for any clarification.          [1]   Current Outpatient Medications on File Prior to Visit   Medication Sig Dispense Refill    atorvastatin (LIPITOR) 20 MG tablet Take 1 tablet (20 mg total) by mouth once daily. 90 tablet 4    carBAMazepine (TEGRETOL) 200 mg tablet Take 200 mg by mouth 2 (two) times a day.      cyanocobalamin (VITAMIN B-12) 1000 MCG tablet Take 2,000 mcg by mouth once daily.      ezetimibe (ZETIA) 10 mg tablet Take 1 tablet (10 mg total) by  mouth once daily. 90 tablet 4    fenofibrate (TRICOR) 54 MG tablet Take 1 tablet (54 mg total) by mouth once daily. 90 tablet 4    fluticasone propionate (FLONASE) 50 mcg/actuation nasal spray 1 spray (50 mcg total) by Each Nostril route 2 (two) times daily as needed for Rhinitis. 15 g 0    gabapentin (NEURONTIN) 300 MG capsule Take 300 mg by mouth 3 (three) times daily.      HYDROcodone-acetaminophen (NORCO) 5-325 mg per tablet Take 1 tablet by mouth every 6 (six) hours as needed for Pain. 12 tablet 0    iron polysaccharides (IFEREX 150) 150 mg iron Cap Take 1 capsule (150 mg total) by mouth every 7 days. 12 capsule 4    levothyroxine (SYNTHROID, LEVOTHROID) 175 MCG tablet Take 1 tablet (175 mcg total) by mouth once daily. 90 tablet 4    meclizine (ANTIVERT) 25 mg tablet Take 1 tablet (25 mg total) by mouth 3 (three) times daily as needed for Dizziness. 20 tablet 0    omega-3/dha/epa/fish oil (OMEGA-3 FISH OIL ORAL) Take 1 capsule by mouth once daily.      ondansetron (ZOFRAN-ODT) 4 MG TbDL Take 1 tablet (4 mg total) by mouth every 8 (eight) hours as needed (nausea). 10 tablet 0    pramipexole (MIRAPEX) 0.5 MG tablet Take 1 mg by mouth 2 (two) times daily.      sumatriptan succinate (IMITREX ORAL) Take 1 tablet by mouth daily as needed.       cetirizine (ZYRTEC) 10 MG tablet Take 1 tablet (10 mg total) by mouth once daily. 30 tablet 0     No current facility-administered medications on file prior to visit.

## 2025-08-06 ENCOUNTER — OFFICE VISIT (OUTPATIENT)
Dept: PODIATRY | Facility: CLINIC | Age: 75
End: 2025-08-06
Payer: MEDICARE

## 2025-08-06 ENCOUNTER — HOSPITAL ENCOUNTER (OUTPATIENT)
Dept: RADIOLOGY | Facility: HOSPITAL | Age: 75
Discharge: HOME | End: 2025-08-06
Attending: PODIATRIST
Payer: MEDICARE

## 2025-08-06 VITALS
HEIGHT: 69 IN | WEIGHT: 221.13 LBS | DIASTOLIC BLOOD PRESSURE: 69 MMHG | SYSTOLIC BLOOD PRESSURE: 120 MMHG | BODY MASS INDEX: 32.75 KG/M2

## 2025-08-06 DIAGNOSIS — L84 CORN OR CALLUS: ICD-10-CM

## 2025-08-06 DIAGNOSIS — M79.671 FOOT PAIN, RIGHT: Primary | ICD-10-CM

## 2025-08-06 DIAGNOSIS — Q66.72 CAVUS DEFORMITY OF BOTH FEET: ICD-10-CM

## 2025-08-06 DIAGNOSIS — M79.671 FOOT PAIN, RIGHT: ICD-10-CM

## 2025-08-06 DIAGNOSIS — R23.4 FISSURE IN SKIN OF RIGHT FOOT: ICD-10-CM

## 2025-08-06 DIAGNOSIS — Q66.71 CAVUS DEFORMITY OF BOTH FEET: ICD-10-CM

## 2025-08-06 PROCEDURE — 1160F RVW MEDS BY RX/DR IN RCRD: CPT | Mod: CPTII,S$GLB,, | Performed by: PODIATRIST

## 2025-08-06 PROCEDURE — 73630 X-RAY EXAM OF FOOT: CPT | Mod: 26,RT,, | Performed by: RADIOLOGY

## 2025-08-06 PROCEDURE — 1101F PT FALLS ASSESS-DOCD LE1/YR: CPT | Mod: CPTII,S$GLB,, | Performed by: PODIATRIST

## 2025-08-06 PROCEDURE — 1125F AMNT PAIN NOTED PAIN PRSNT: CPT | Mod: CPTII,S$GLB,, | Performed by: PODIATRIST

## 2025-08-06 PROCEDURE — 99999 PR PBB SHADOW E&M-EST. PATIENT-LVL V: CPT | Mod: PBBFAC,,, | Performed by: PODIATRIST

## 2025-08-06 PROCEDURE — 3044F HG A1C LEVEL LT 7.0%: CPT | Mod: CPTII,S$GLB,, | Performed by: PODIATRIST

## 2025-08-06 PROCEDURE — 73630 X-RAY EXAM OF FOOT: CPT | Mod: TC,PO,RT

## 2025-08-06 PROCEDURE — 1159F MED LIST DOCD IN RCRD: CPT | Mod: CPTII,S$GLB,, | Performed by: PODIATRIST

## 2025-08-06 PROCEDURE — 3074F SYST BP LT 130 MM HG: CPT | Mod: CPTII,S$GLB,, | Performed by: PODIATRIST

## 2025-08-06 PROCEDURE — 99203 OFFICE O/P NEW LOW 30 MIN: CPT | Mod: S$GLB,,, | Performed by: PODIATRIST

## 2025-08-06 PROCEDURE — 3008F BODY MASS INDEX DOCD: CPT | Mod: CPTII,S$GLB,, | Performed by: PODIATRIST

## 2025-08-06 PROCEDURE — 3288F FALL RISK ASSESSMENT DOCD: CPT | Mod: CPTII,S$GLB,, | Performed by: PODIATRIST

## 2025-08-06 PROCEDURE — 3078F DIAST BP <80 MM HG: CPT | Mod: CPTII,S$GLB,, | Performed by: PODIATRIST

## 2025-08-06 RX ORDER — AMMONIUM LACTATE 12 G/100G
LOTION TOPICAL 2 TIMES DAILY
Qty: 400 G | Refills: 5 | Status: SHIPPED | OUTPATIENT
Start: 2025-08-06

## 2025-08-06 NOTE — PROGRESS NOTES
"  Podiatry Progress Note      Subjective:      Patient ID: Trell Richmond is a 75 y.o. male.    History of Present Illness    CHIEF COMPLAINT:  - Right foot pain    HPI:  Trell presents with right foot pain, self-diagnosed as plantar fasciitis based on previous experience. He reports sharp, intermittent pain in the foot, rating it as 8-9/10 in severity. Pain occurs at various times, including during showers and while sitting. Pressure on the arch of the foot, particularly in the middle of the heel, exacerbates the pain. He also reports discomfort when the skin on the foot is touched, suggesting a possible callus issue. Filing down the tough skin area provides some relief.    He wears "Walk [employer]" shoes, which provide mild relief. He has a history of wearing flip-flops, which may have contributed to callus formation. The pain also affects the left leg, possibly due to weight shifting. He is not currently using any lotions for the condition.    He reports lower lumbar back pain, for which he sees a pain practitioner, and right knee pain. Despite back pain, he avoids canceling appointments.    He does not have tingling, burning, toe pain, calf pain, Achilles tendon pain, and posterior tibial tendon pain.    PREVIOUS TREATMENTS:  - Trell used a lotion prescribed by a dermatologist about a year ago for rough skin on the foot, which provided minimal benefit.  - Trell has been filing down the callus on the foot, which provided some relief.    MEDICATIONS:  - Naproxen           Chief Complaint: Plantar Fasciitis (Right foot), Callouses, and Peripheral Neuropathy      Problem List[1]    Medications Ordered Prior to Encounter[2]    Review of patient's allergies indicates:   Allergen Reactions    Niacin      Other reaction(s): FLUSHING AND ITCHING, FLUSHING AND ITCHING with 1500mg dose    Zanaflex [tizanidine]      dizziness    Oxycodone Rash       Past Surgical History:   Procedure Laterality Date    ablasion Left     " "venous- leg    BACK SURGERY  11/2020    COLONOSCOPY  02/12/2019    Done by Dr. DAVID Romero    DENTAL SURGERY      EPIDURAL STEROID INJECTION  03/2018, 09/2018    pat reported    FRACTURE SURGERY  1964    Broken arm    head surgery      for subdural hematoma    HERNIA REPAIR  5-2000    Abdominal    MYELOGRAPHY N/A 10/07/2020    Procedure: MYELOGRAM;  Surgeon: Cinda Diagnostic Provider;  Location: [facility] OR;  Service: Interventional Radiology;  Laterality: N/A;    PROSTATE SURGERY      RHIZOTOMY  03/2019    pat reported    sinus bone spur      SPINE SURGERY  11/02/2020    Spinal stenosis    TONSILLECTOMY      UMBILICAL HERNIA REPAIR  1999    @ [facility]       Family History   Problem Relation Name Age of Onset    Diabetes Mother Christine Richmond     Cancer Sister      Alcohol abuse Father Renzo Richmond     Cancer Father Renzo Richmond     Heart disease Father Renzo Richmond         Myocardial infarction    Alcohol abuse Sister Catherine Richmond     Cancer Brother Ji Richmond        Social History[3]    Review of Systems   Constitutional: Negative for chills and fever.   Cardiovascular:  Negative for claudication and leg swelling.   Respiratory:  Negative for cough and shortness of breath.    Skin:  Positive for dry skin and nail changes. Negative for itching and rash.   Musculoskeletal:  Positive for joint pain and myalgias. Negative for falls, joint swelling and muscle weakness.   Gastrointestinal:  Negative for diarrhea, nausea and vomiting.   Neurological:  Positive for paresthesias. Negative for numbness, tremors and weakness.   Psychiatric/Behavioral:  Negative for altered mental status and hallucinations.            Objective:      Vitals:    08/06/25 1104   BP: 120/69   Weight: 100.3 kg (221 lb 1.9 oz)   Height: 5' 9" (1.753 m)   PainSc:   7       Physical Exam  Vitals and nursing note reviewed.   Constitutional:       General: He is not in acute distress.     Appearance: He is well-developed. He is not " toxic-appearing or diaphoretic.      Comments: alert and oriented x 3.    Cardiovascular:      Pulses:           Dorsalis pedis pulses are 2+ on the right side and 2+ on the left side.        Posterior tibial pulses are 2+ on the right side and 2+ on the left side.      Comments:  Capillary refill time is within normal limits. Digital hair present.   Pulmonary:      Effort: No respiratory distress.   Musculoskeletal:         General: No deformity.      Right ankle: No tenderness. No lateral malleolus, medial malleolus, AITF ligament, CF ligament or posterior TF ligament tenderness.      Right Achilles Tendon: No defects. Scott's test negative.      Left ankle: No tenderness. No lateral malleolus, medial malleolus, AITF ligament, CF ligament or posterior TF ligament tenderness.      Left Achilles Tendon: No defects. Scott's test negative.      Right foot: Tenderness (primarily to plantar heel fissuring/callus) present. No bony tenderness.      Left foot: No tenderness or bony tenderness.      Comments: Bilateral heel varus. There is a bilateral anterior cavus foot deformity that is rigid with minimal compensation. The patient has equinus deformity bilateral, right being worse than the left. First MPJ dorsiflexion is 25 degrees bilateral. Gait analysis reveals an early heel off with the fore foot bilateral striking longer in midstance. Patient shoes demonstrated medial heel counter wear bilateral.      Muscle strength is 5/5 in all groups bilaterally.           Feet:      Right foot:      Skin integrity: Callus and fissure (see image below) present.      Left foot:      Skin integrity: Callus present.   Lymphadenopathy:      Comments: No lymphatic streaking     Skin:     General: Skin is warm and dry.      Coloration: Skin is not pale.      Findings: No rash.      Nails: There is no clubbing.      Comments: Skin is of normal turgor.   Normal temperature gradient.   Neurological:      Sensory: No sensory deficit.       Motor: No atrophy.      Comments: Light touch present     Psychiatric:         Attention and Perception: He is attentive.         Mood and Affect: Mood is not anxious. Affect is not inappropriate.         Speech: He is communicative. Speech is not slurred.         Behavior: Behavior is not combative.                   08/06/2025    Focal hyperkeratotic lesion consisting entirely of hyperkeratotic tissue with fissuring but without open skin, drainage, pus, fluctuance, malodor, or signs of infection to posterior right heel as pictured          Assessment and Plan:       Assessment & Plan    M79.671 Foot pain, right  L84 [place] or callus  R23.4 Fissure in skin of right foot  Q66.71, Q66.72 Cavus deformity of both feet    FOOT PAIN, RIGHT:  > Use gel pads in shoes for extra cushioning.  > Ordered XR Foot to evaluate for potential bone changes contributing to pressure points.    CORN OR CALLUS:  > Apply urea-based lotion daily after shower or bath to help reduce callus formation.  > Apply glycolic acid to callused areas 2-3 times per week for exfoliation.  > File down calluses a couple times per week.  > Use shoes with good arch support and avoid open-backed shoes to prevent callus formation.    CAVUS DEFORMITY OF BOTH FEET:  > Perform stretching exercises to increase flexibility of the Achilles tendon.  > Use shoes with good arch support and avoid open-backed shoes to prevent callus formation.     Discussed biomechanical deformity correction surgically vs conservative management     Cosmetic procedures such as fillers also discussed    Based on chart review this patient does not qualify for nail care/callus trimming (Patients who qualify are usually as follows: diabetic with neurological manifestations, PVD, pernicious anemia, or CKD with appropriate modifiers that indicate high amputation risk).     Patient was advised use of a pumice stone, and skin emollients to slow the progression of callus formation.      Dispensed information on proper shoe fit and modification including inserts. Patient dispensed devices to assist in offloading such as non medicated corn pads    Pt to RTC as needed as procedure B for trimming of callus or if he wishes to pursue surgical intervention.                    This note was generated with the assistance of ambient listening technology. Verbal consent was obtained by the patient and accompanying visitor(s) for the recording of patient appointment to facilitate this note. I attest to having reviewed and edited the generated note for accuracy, though some syntax or spelling errors may persist. Please contact the author of this note for any clarification.            [1]   Patient Active Problem List  Diagnosis    Hypothyroidism due to acquired atrophy of thyroid    Peripheral neuropathy, idiopathic    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    Benign prostatic hyperplasia with urinary retention    History of subdural hematoma    History of Hashimoto thyroiditis    Primary osteoarthritis involving multiple joints    Sleep apnea    History of nephrolithiasis    Encounter for long-term (current) use of medications    Iron deficiency anemia    Restless leg syndrome    Moderate hypertension    Screening PSA (prostate specific antigen)    Asymptomatic varicose veins of both lower extremities    Supraventricular tachycardia    Impaired functional mobility, balance, gait, and endurance    Class 2 severe obesity due to excess calories with serious comorbidity and body mass index (BMI) of 35.0 to 35.9 in adult   [2]   Current Outpatient Medications on File Prior to Visit   Medication Sig Dispense Refill    atorvastatin (LIPITOR) 20 MG tablet Take 1 tablet (20 mg total) by mouth once daily. 90 tablet 4    carBAMazepine (TEGRETOL) 200 mg tablet Take 200 mg by mouth 2 (two) times a day.      cyanocobalamin (VITAMIN B-12) 1000 MCG tablet Take 2,000 mcg by  mouth once daily.      ezetimibe (ZETIA) 10 mg tablet Take 1 tablet (10 mg total) by mouth once daily. 90 tablet 4    fenofibrate (TRICOR) 54 MG tablet Take 1 tablet (54 mg total) by mouth once daily. 90 tablet 4    fluticasone propionate (FLONASE) 50 mcg/actuation nasal spray 1 spray (50 mcg total) by Each Nostril route 2 (two) times daily as needed for Rhinitis. 15 g 0    gabapentin (NEURONTIN) 300 MG capsule Take 300 mg by mouth 3 (three) times daily.      HYDROcodone-acetaminophen (NORCO) 5-325 mg per tablet Take 1 tablet by mouth every 6 (six) hours as needed for Pain. 12 tablet 0    iron polysaccharides (IFEREX 150) 150 mg iron Cap Take 1 capsule (150 mg total) by mouth every 7 days. 12 capsule 4    levothyroxine (SYNTHROID, LEVOTHROID) 175 MCG tablet Take 1 tablet (175 mcg total) by mouth once daily. 90 tablet 4    meclizine (ANTIVERT) 25 mg tablet Take 1 tablet (25 mg total) by mouth 3 (three) times daily as needed for Dizziness. 20 tablet 0    omega-3/dha/epa/fish oil (OMEGA-3 FISH OIL ORAL) Take 1 capsule by mouth once daily.      ondansetron (ZOFRAN-ODT) 4 MG TbDL Take 1 tablet (4 mg total) by mouth every 8 (eight) hours as needed (nausea). 10 tablet 0    pramipexole (MIRAPEX) 0.5 MG tablet Take 1 mg by mouth 2 (two) times daily.      sumatriptan succinate (IMITREX ORAL) Take 1 tablet by mouth daily as needed.       cetirizine (ZYRTEC) 10 MG tablet Take 1 tablet (10 mg total) by mouth once daily. 30 tablet 0     No current facility-administered medications on file prior to visit.   [3]   Social History  Socioeconomic History    Marital status: Single   Tobacco Use    Smoking status: Never    Smokeless tobacco: Never   Substance and Sexual Activity    Alcohol use: No    Drug use: No    Sexual activity: Yes     Partners: Female     Birth control/protection: Condom     Social Drivers of Health     Financial Resource Strain: Low Risk  (8/4/2025)    Overall Financial Resource Strain (CARDIA)     Difficulty of  Paying Living Expenses: Not hard at all   Food Insecurity: No Food Insecurity (8/4/2025)    Hunger Vital Sign     Worried About Running Out of Food in the Last Year: Never true     Ran Out of Food in the Last Year: Never true   Transportation Needs: No Transportation Needs (8/4/2025)    PRAPARE - Transportation     Lack of Transportation (Medical): No     Lack of Transportation (Non-Medical): No   Physical Activity: Insufficiently Active (8/4/2025)    Exercise Vital Sign     Days of Exercise per Week: 1 day     Minutes of Exercise per Session: 20 min   Stress: Stress Concern Present (8/4/2025)    [employer] - Occupational Stress Questionnaire     Feeling of Stress : To some extent   Housing Stability: Low Risk  (8/4/2025)    Housing Stability Vital Sign     Unable to Pay for Housing in the Last Year: No     Number of Times Moved in the Last Year: 0     Homeless in the Last Year: No

## 2025-08-06 NOTE — PATIENT INSTRUCTIONS
Shoe purchasing ideas: (try 6pm.[employer], zappos.Tri-Medics , nordstromrack.Tri-Medics, or shoes[employer] for discounted prices) you can visit varsity shoes in [facility] Shoe [facility], DSW shoes in [place]  or cruz rack in the [facility] (there are also several shoe brand outlets in the [facility])    ONLY purchase stability style tennis shoes AVOID flex, foam, free, yoga mat style shoes or shoes that claim to feel like clouds  If you have a flatter foot purchase shoes for PRONATION  If you have a high arch purchase shoes for SUPINATION    Shoe examples:    Asics (GT or gel foundations, gel-kayano-30), new balance stability type shoes (such as the 940 series or the [id number]), saucony (Guide 16, stabil c3),  Munguia (GTS or Beast or transcend), propet, HokaOne (loli 7, arahi, luz) Trevor (tennis shoes and boots)    Sofft Brand (women) Mona&Justin (men), barelauren, clarks, crocs, aerosoles, naturalizers, SAS, ecco, born, ame chavez, rockports (dress shoes)    Vionic, burkenstocks, fitflops, propet, taos, baretraps, oofos, Hoka or vionic recovery slides/sandals (sandals)    Hoka or vionic recovery slides/sandals, birkenstock rubber sandals, crocs(especially the recovery and support styles), propet. Bared, vionic slippers, PowerStep slippers, Aetrex slippers (house shoes)    Over the Counter Insert Recommendations (always go for FULL length inserts):  - Spenco brand (orthotic are or total support)  - SuperFeet (look for the ones that offer support and/or pain relief)  - Yin   - PowerStep [place]  - OrthoFeet      Over the counter pain creams: Voltaren Gel, Biofreeze, Bengay, tiger balm, two old goat, lidocaine gel,  Absorbine Veterinary Liniment Gel Topical Analgesic Sore Muscle and Joint Pain Relief    Alternative Pain remedies: Omega-3 EFAs, tumeric with black pepper, green tea, Bromelain, Arnica, garlic    Anti-inflammatory foods  An anti-inflammatory diet should include these foods:  tomatoes  olive oil  green leafy  vegetables, such as spinach, kale, and collards  nuts like almonds and walnuts  fatty fish like salmon, mackerel, tuna, and sardines  fruits such as strawberries, blueberries, cherries, and oranges   Foods that cause inflammation  Try to avoid or limit these foods as much as possible:  refined carbohydrates, such as white bread and pastries  French fries and other fried foods  soda and other sugar-sweetened beverages  red meat (burgers, steaks) and processed meat (hot dogs, sausage)  margarine, shortening, and lard          Recommend lotions: eucerin, eucerin for diabetics, aquaphor, A&D ointment, gold bond for diabetics, sween, Hunter's Bees all purpose baby ointment,  urea 40 with aloe or SkinIntegra rapid crack repair (found on amazon.com)    Nail Home remedy:  Vicks Vapor rub or Emuaid to nails for easier manageability    Occasional soaks for 15-20 mins in luke warm water with 1/2 cup of listerine and 1 cup of apple cider vinegar are ok You may add several drops of oil of oregano or tea tree oil as well      Glycolic Acid Use for Foot Calluses and Rough Skin     You can use a topical product containing glycolic acid (10-20%) to help soften calluses and rough skin on the bottoms of your feet. Glycolic acid is an alpha hydroxy acid (AHA) that gently exfoliates dead skin cells and improves skin texture over time.      How to Use:        Start slow: Use 2-3 times per week to begin. If tolerated well, increase to daily use.  Apply at night: After cleansing and drying your feet, apply a thin layer of the glycolic acid lotion, cream, or gel directly to the callused areas.  Avoid open wounds: Do not apply to cracked skin, ulcers, or areas with open sores.  Moisturize: Follow with a thick moisturizer or foot balm to seal in hydration.  Protect: Wear cotton socks overnight to enhance absorption and avoid transfer to sheets.  Rinse off in the morning.       What to Expect:        You may feel slight tingling or warmth after  application.  Over several weeks, skin should appear smoother and softer.  Flaking or peeling is possible--this is normal and indicates exfoliation.         Cautions:      Stop use if you experience burning, redness, or irritation.  Do not use on infected areas or if you have diabetes, circulation concerns, or neuropathy  Do not combine with other exfoliating acids or peeling products unless directed.      Recommended OTC options: Look for foot creams or peels labeled with glycolic acid 10-20%. Avoid products with salicylic acid unless directed, as combining acids can increase irritation.            Wearing Proper Shoes                    You walk on your feet every day, forcing them to support the weight of your body. Repeated stress on your feet can cause damage over time. The right shoes can help protect your feet. The wrong shoes can cause more foot problems. Read the information below to help you find a shoe that fits your foot needs.     A good shoe fit will cover your foot outline.       A shoe that doesnt cover the outline is a bad fit.      Whats your foot shape?  To get a good fit, you need to know the shape of your foot. Do this simple test: While standing, place your foot on a piece of paper and trace around it. Is your foot straight or curved? Do you have a foot problem, such as a bunion, that causes your foot outline to show a bulge on the side of your big toe?  Finding your fit  Bring your foot outline to the shoe store to help you find the right shoe. Place a shoe you like on top of the outline to see if it matches the shape. The shoe should cover the outline. (If you have a bunion, the shoe may not cover the bulge on the outline. Look for soft leather shoes to stretch over the bunion.) Once youve found a pair of proper shoes, put them on. Walk around. Be sure the shoes dont rub or pinch. If the shoes feel good, youve found your fit!  The right shoe for you  A good shoe has features that provide  comfort and support. It must also be the right size and shape for your feet. Look for a shoe made of breathable fabric and lining, such as leather or canvas. Be sure that shoes have enough tread to prevent slipping. Go to a good shoe store for help finding the right shoe.  Good shoe features  An ideal shoe has the following:  Laces for support. If tying laces is a problem for you, try shoes with Velcro fasteners or selwyn.  A front of the shoe (toe box) with ½ inch space in front of your longest toes.  An arch shape that supports your foot.  No more than 1½ inches of heel.  A stiff, snug back of the shoe to keep your foot from sliding around.  A smooth lining with no rough seams.  Shoe shopping tips  Below are some dos and donts for when you go to the shoe store.  Do:  Select the shoes that feel right. Wear them around the house. Then bring them to your foot healthcare provider to check for fit. If they dont fit well, return them.  Shop late in the day, when your feet will be slightly bigger.  Each time you buy shoes, have both your feet measured while you are standing. Foot size changes with time.  Pick shoes to suit their purpose. High heels are OK for an occasional night on the town. But for everyday wear, choose a more sensible shoe.  Try on shoes while wearing any inserts specially made for your feet (orthoses).  Try on both the right and left shoes. If your feet are different sizes, pick a pair that fits the larger foot.  Dont:  Dont buy shoes based on shoe size alone. Always try on shoes, as sizes differ from brand to brand and within brands.  Dont expect shoes to break in. If they dont fit at the store, dont buy them.  Dont buy a shoe that doesnt match your foot shape.  What about socks?  Always wear socks with shoes. Socks help absorb sweat and reduce friction and blistering. When shopping for shoes, choose soft, padded socks with seams that dont irritate your feet.  If you have foot  problems  Some foot problems cause deformities. This can make it hard to find a good fit. Look for shoes made of soft leather to stretch over the deformity. If you have bunions, buy shoes with a wider toe box. To fit hammertoes, look for shoes with a tall toe box. If you have arch problems, you may need inserts. In some cases, youll need to have custom footwear or orthoses made for your feet.  Suggested footwear  Ask your healthcare provider what kind of footwear you need. He or she may recommend a certain brand or shoe store.  Date Last Reviewed: 8/1/2016 © 2000-2016 [employer]. [address] [place] [address], [place] [address]. All rights reserved. This information is not intended as a substitute for professional medical care. Always follow your healthcare professional's instructions.        What Are Corns and Calluses?    Corns and calluses are your bodys response to friction or pressure against the skin. If your foot rubs the inside of your shoe, the affected area of skin thickens. Or, if a bone is not in the normal position, skin caught between bone and shoe or bone and ground builds up. In either case, the outer layer of skin thickens to protect the foot from unusual pressure. In many cases, corns and calluses look bad but are not harmful. However, more severe corns and calluses may become infected, destroy healthy tissue, or affect foot movement.    Corns  Corns usually grow on top of the foot, often at the toe joint. Corns can range from a slight thickening of skin to a painful soft or hard bump. They often form on top of buckled toe joints (hammer toes). If your toes curl under, corns may grow on the tips of the toes. You may also get a corn on the end of a toe if it rubs against your shoe. Corns can also grow between toes, often between the first and second toes.    Calluses  Calluses grow on the bottom of the foot or on the outer edge of a toe or heel. A callus may spread across the ball of your  foot. This type of callus is usually due to a problem with a metatarsal (the long bone at the base of a toe, near the ball of the foot). A pinch callus may grow along the outer edge of the heel or the big toe. Some calluses press up into the foot instead of spreading on the outside. A callus may form a central core or plug of tissue where pressure is greatest.  Date Last Reviewed: 9/21/2015 © 2000-2016 [employer]. [address] [place] [address]. All rights reserved. This information is not intended as a substitute for professional medical care. Always follow your healthcare professional's instructions.        Treating Corns and Calluses  If your corns or calluses are mild, reducing friction may help. Different shoes, moleskin patches, or soft pads may be all the treatment you need. In more severe cases, treating tissue buildup may require your doctors care. Sometimes custom-made shoe inserts (orthotics) or special pads are prescribed to reduce friction and pressure.    Change shoes  If you have corns, your doctor may suggest wearing shoes that have more toe room. This way, buckled joints are less likely to be pinched against the top of the shoe. If you have calluses, wearing a cushioned insole, arch support, or heel counter can help reduce friction.  Visit your doctor  In some cases, your doctor may trim away the outer layers of skin that make up the corn or callus. For a painful corn, medicine may be injected beneath the built-up tissue.  Wear orthotics  Orthotics are specially made to meet the needs of your feet. They cushion calluses or divert pressure away from these problem areas. Worn as directed, orthotics help limit existing problems and prevent new ones from forming.  If you need surgery  If a bone or joint is out of place, certain parts of your foot may be under too much pressure. This can cause severe corns and calluses. In such cases, surgery may be the best way to correct the  problem.  Outpatient procedures  In most cases, surgery to improve bone position is an outpatient procedure. Your doctor may cut away excess bone, reposition prominent bones, or even fuse joints. Sometimes tendons or ligaments are cut to reduce tension on a bone or joint. Your doctor will talk with you about the procedure that is best suited to your needs.  Date Last Reviewed: 7/1/2016  © 9193-2477 [employer]. [address] [place] [address]. All rights reserved. This information is not intended as a substitute for professional medical care. Always follow your healthcare professional's instructions.

## 2025-08-07 ENCOUNTER — RESULTS FOLLOW-UP (OUTPATIENT)
Dept: PODIATRY | Facility: CLINIC | Age: 75
End: 2025-08-07
Payer: MEDICARE

## 2025-08-13 ENCOUNTER — HOSPITAL ENCOUNTER (OUTPATIENT)
Dept: RADIOLOGY | Facility: HOSPITAL | Age: 75
Discharge: HOME | End: 2025-08-13
Attending: ORTHOPAEDIC SURGERY
Payer: MEDICARE

## 2025-08-13 DIAGNOSIS — M54.9 DORSALGIA, UNSPECIFIED: ICD-10-CM

## 2025-08-13 DIAGNOSIS — R26.89 IMBALANCE: ICD-10-CM

## 2025-08-13 DIAGNOSIS — M47.812 CERVICAL SPONDYLOSIS: ICD-10-CM

## 2025-08-13 DIAGNOSIS — M54.12 CERVICAL RADICULOPATHY: ICD-10-CM

## 2025-08-13 DIAGNOSIS — M41.50 DEGENERATIVE SCOLIOSIS: ICD-10-CM

## 2025-08-13 DIAGNOSIS — M48.02 SPINAL STENOSIS, CERVICAL REGION: ICD-10-CM

## 2025-08-13 DIAGNOSIS — M48.07 SPINAL STENOSIS, LUMBOSACRAL REGION: ICD-10-CM

## 2025-08-13 PROCEDURE — 72050 X-RAY EXAM NECK SPINE 4/5VWS: CPT | Mod: 26,,, | Performed by: RADIOLOGY

## 2025-08-13 PROCEDURE — 72082 X-RAY EXAM ENTIRE SPI 2/3 VW: CPT | Mod: TC

## 2025-08-13 PROCEDURE — 72082 X-RAY EXAM ENTIRE SPI 2/3 VW: CPT | Mod: 26,,, | Performed by: RADIOLOGY

## 2025-08-13 PROCEDURE — 72050 X-RAY EXAM NECK SPINE 4/5VWS: CPT | Mod: TC

## 2025-08-13 PROCEDURE — 72131 CT LUMBAR SPINE W/O DYE: CPT | Mod: 26,,, | Performed by: RADIOLOGY

## 2025-08-13 PROCEDURE — 72131 CT LUMBAR SPINE W/O DYE: CPT | Mod: TC

## 2025-08-19 ENCOUNTER — CLINICAL SUPPORT (OUTPATIENT)
Dept: REHABILITATION | Facility: HOSPITAL | Age: 75
End: 2025-08-19
Attending: NURSE PRACTITIONER
Payer: MEDICARE

## 2025-08-19 ENCOUNTER — HOSPITAL ENCOUNTER (OUTPATIENT)
Dept: CARDIOLOGY | Facility: HOSPITAL | Age: 75
Discharge: HOME | End: 2025-08-19
Attending: INTERNAL MEDICINE
Payer: MEDICARE

## 2025-08-19 DIAGNOSIS — R42 DIZZINESS: ICD-10-CM

## 2025-08-19 DIAGNOSIS — R29.3 POSTURE ABNORMALITY: ICD-10-CM

## 2025-08-19 DIAGNOSIS — R26.89 IMBALANCE: Primary | ICD-10-CM

## 2025-08-19 PROCEDURE — 97162 PT EVAL MOD COMPLEX 30 MIN: CPT | Mod: PN | Performed by: PHYSICAL THERAPIST

## 2025-08-19 PROCEDURE — 93225 XTRNL ECG REC<48 HRS REC: CPT | Mod: PN

## 2025-08-20 ENCOUNTER — HOSPITAL ENCOUNTER (EMERGENCY)
Facility: HOSPITAL | Age: 75
Discharge: HOME | End: 2025-08-20
Attending: STUDENT IN AN ORGANIZED HEALTH CARE EDUCATION/TRAINING PROGRAM
Payer: MEDICARE

## 2025-08-20 PROBLEM — G95.9 LUMBAR MYELOPATHY: Status: ACTIVE | Noted: 2025-08-20

## 2025-08-21 PROBLEM — R26.89 IMBALANCE: Status: ACTIVE | Noted: 2025-08-21

## 2025-08-21 PROBLEM — R29.3 POSTURE ABNORMALITY: Status: ACTIVE | Noted: 2025-08-21

## 2025-08-22 LAB
OHS CV EVENT MONITOR DAY: 0
OHS CV HOLTER LENGTH DECIMAL HOURS: 23
OHS CV HOLTER LENGTH HOURS: 23
OHS CV HOLTER LENGTH MINUTES: 0
OHS CV HOLTER SINUS AVERAGE HR: 79
OHS CV HOLTER SINUS MAX HR: 108
OHS CV HOLTER SINUS MIN HR: 54

## 2025-08-26 ENCOUNTER — CLINICAL SUPPORT (OUTPATIENT)
Dept: REHABILITATION | Facility: HOSPITAL | Age: 75
End: 2025-08-26
Payer: MEDICARE

## 2025-08-26 DIAGNOSIS — R26.89 IMBALANCE: Primary | ICD-10-CM

## 2025-08-26 DIAGNOSIS — R29.3 POSTURE ABNORMALITY: ICD-10-CM

## 2025-08-26 PROCEDURE — 97110 THERAPEUTIC EXERCISES: CPT | Mod: PN | Performed by: PHYSICAL THERAPIST

## 2025-08-26 PROCEDURE — 97112 NEUROMUSCULAR REEDUCATION: CPT | Mod: PN | Performed by: PHYSICAL THERAPIST

## 2025-08-27 ENCOUNTER — OFFICE VISIT (OUTPATIENT)
Dept: ORTHOPEDICS | Facility: CLINIC | Age: 75
End: 2025-08-27
Attending: ORTHOPAEDIC SURGERY
Payer: MEDICARE

## 2025-08-27 DIAGNOSIS — Z98.1 HISTORY OF LUMBAR FUSION: ICD-10-CM

## 2025-08-27 DIAGNOSIS — M51.362 DEGENERATION OF INTERVERTEBRAL DISC OF LUMBAR REGION WITH DISCOGENIC BACK PAIN AND LOWER EXTREMITY PAIN: ICD-10-CM

## 2025-08-27 DIAGNOSIS — S32.009K LUMBAR PSEUDOARTHROSIS: ICD-10-CM

## 2025-08-27 DIAGNOSIS — M47.816 LUMBAR SPONDYLOSIS: ICD-10-CM

## 2025-08-27 DIAGNOSIS — M47.812 CERVICAL SPONDYLOSIS: ICD-10-CM

## 2025-08-27 DIAGNOSIS — M50.30 DDD (DEGENERATIVE DISC DISEASE), CERVICAL: ICD-10-CM

## 2025-08-27 DIAGNOSIS — M54.6 PAIN IN THORACIC SPINE: ICD-10-CM

## 2025-08-27 DIAGNOSIS — M54.16 LUMBAR RADICULOPATHY: ICD-10-CM

## 2025-08-27 DIAGNOSIS — M54.12 CERVICAL RADICULOPATHY: ICD-10-CM

## 2025-08-27 DIAGNOSIS — M41.50 DEGENERATIVE SCOLIOSIS: Primary | ICD-10-CM

## 2025-08-27 PROCEDURE — 99999 PR PBB SHADOW E&M-EST. PATIENT-LVL III: CPT | Mod: PBBFAC,,, | Performed by: ORTHOPAEDIC SURGERY

## 2025-08-28 ENCOUNTER — PATIENT MESSAGE (OUTPATIENT)
Dept: NEUROLOGY | Facility: CLINIC | Age: 75
End: 2025-08-28
Payer: MEDICARE

## 2025-08-29 ENCOUNTER — OFFICE VISIT (OUTPATIENT)
Dept: CARDIOLOGY | Facility: CLINIC | Age: 75
End: 2025-08-29
Payer: MEDICARE

## 2025-08-29 VITALS
WEIGHT: 223.13 LBS | DIASTOLIC BLOOD PRESSURE: 75 MMHG | OXYGEN SATURATION: 93 % | HEART RATE: 83 BPM | RESPIRATION RATE: 18 BRPM | SYSTOLIC BLOOD PRESSURE: 119 MMHG | BODY MASS INDEX: 33.05 KG/M2 | HEIGHT: 69 IN

## 2025-08-29 DIAGNOSIS — I25.10 CORONARY ARTERY DISEASE, UNSPECIFIED VESSEL OR LESION TYPE, UNSPECIFIED WHETHER ANGINA PRESENT, UNSPECIFIED WHETHER NATIVE OR TRANSPLANTED HEART: ICD-10-CM

## 2025-08-29 DIAGNOSIS — E78.5 DYSLIPIDEMIA: ICD-10-CM

## 2025-08-29 DIAGNOSIS — I10 HYPERTENSION, UNSPECIFIED TYPE: Primary | ICD-10-CM

## 2025-08-29 DIAGNOSIS — I25.10 CORONARY ARTERY CALCIFICATION: ICD-10-CM

## 2025-08-29 PROCEDURE — 99999 PR PBB SHADOW E&M-EST. PATIENT-LVL III: CPT | Mod: PBBFAC,,, | Performed by: INTERNAL MEDICINE

## 2025-08-30 LAB
OHS QRS DURATION: 98 MS
OHS QTC CALCULATION: 417 MS

## 2025-09-02 ENCOUNTER — DOCUMENTATION ONLY (OUTPATIENT)
Dept: REHABILITATION | Facility: HOSPITAL | Age: 75
End: 2025-09-02
Payer: MEDICARE